# Patient Record
Sex: FEMALE | Race: OTHER | HISPANIC OR LATINO | Employment: FULL TIME | URBAN - NONMETROPOLITAN AREA
[De-identification: names, ages, dates, MRNs, and addresses within clinical notes are randomized per-mention and may not be internally consistent; named-entity substitution may affect disease eponyms.]

---

## 2022-09-23 ENCOUNTER — APPOINTMENT (OUTPATIENT)
Dept: LAB | Facility: CLINIC | Age: 42
End: 2022-09-23
Payer: COMMERCIAL

## 2022-09-23 DIAGNOSIS — Z13.220 SCREENING FOR LIPID DISORDERS: ICD-10-CM

## 2022-09-23 DIAGNOSIS — Z13.21 ENCOUNTER FOR VITAMIN DEFICIENCY SCREENING: ICD-10-CM

## 2022-09-23 DIAGNOSIS — Z11.59 NEED FOR HEPATITIS C SCREENING TEST: ICD-10-CM

## 2022-09-23 DIAGNOSIS — Z00.00 UNSPECIFIED GENERAL MEDICAL EXAMINATION: ICD-10-CM

## 2022-09-23 DIAGNOSIS — Z13.0 SCREENING FOR DEFICIENCY ANEMIA: ICD-10-CM

## 2022-09-23 DIAGNOSIS — Z13.29 SCREENING FOR THYROID DISORDER: ICD-10-CM

## 2022-09-23 LAB
BASOPHILS # BLD AUTO: 0.02 THOUSANDS/ΜL (ref 0–0.1)
BASOPHILS NFR BLD AUTO: 0 % (ref 0–1)
EOSINOPHIL # BLD AUTO: 0.02 THOUSAND/ΜL (ref 0–0.61)
EOSINOPHIL NFR BLD AUTO: 0 % (ref 0–6)
ERYTHROCYTE [DISTWIDTH] IN BLOOD BY AUTOMATED COUNT: 17.9 % (ref 11.6–15.1)
HCT VFR BLD AUTO: 35.7 % (ref 34.8–46.1)
HGB BLD-MCNC: 11.5 G/DL (ref 11.5–15.4)
IMM GRANULOCYTES # BLD AUTO: 0.01 THOUSAND/UL (ref 0–0.2)
IMM GRANULOCYTES NFR BLD AUTO: 0 % (ref 0–2)
LYMPHOCYTES # BLD AUTO: 1.83 THOUSANDS/ΜL (ref 0.6–4.47)
LYMPHOCYTES NFR BLD AUTO: 28 % (ref 14–44)
MCH RBC QN AUTO: 22.5 PG (ref 26.8–34.3)
MCHC RBC AUTO-ENTMCNC: 32.2 G/DL (ref 31.4–37.4)
MCV RBC AUTO: 70 FL (ref 82–98)
MONOCYTES # BLD AUTO: 0.59 THOUSAND/ΜL (ref 0.17–1.22)
MONOCYTES NFR BLD AUTO: 9 % (ref 4–12)
NEUTROPHILS # BLD AUTO: 4.09 THOUSANDS/ΜL (ref 1.85–7.62)
NEUTS SEG NFR BLD AUTO: 63 % (ref 43–75)
NRBC BLD AUTO-RTO: 0 /100 WBCS
PLATELET # BLD AUTO: 331 THOUSANDS/UL (ref 149–390)
PMV BLD AUTO: 10.8 FL (ref 8.9–12.7)
RBC # BLD AUTO: 5.1 MILLION/UL (ref 3.81–5.12)
WBC # BLD AUTO: 6.56 THOUSAND/UL (ref 4.31–10.16)

## 2022-09-23 PROCEDURE — 36415 COLL VENOUS BLD VENIPUNCTURE: CPT

## 2022-09-23 PROCEDURE — 86803 HEPATITIS C AB TEST: CPT

## 2022-09-23 PROCEDURE — 84443 ASSAY THYROID STIM HORMONE: CPT

## 2022-09-23 PROCEDURE — 85025 COMPLETE CBC W/AUTO DIFF WBC: CPT

## 2022-09-23 PROCEDURE — 80053 COMPREHEN METABOLIC PANEL: CPT

## 2022-09-23 PROCEDURE — 82306 VITAMIN D 25 HYDROXY: CPT

## 2022-09-23 PROCEDURE — 80061 LIPID PANEL: CPT

## 2022-09-24 LAB
25(OH)D3 SERPL-MCNC: 20.6 NG/ML (ref 30–100)
ALBUMIN SERPL BCP-MCNC: 3.7 G/DL (ref 3.5–5)
ALP SERPL-CCNC: 53 U/L (ref 46–116)
ALT SERPL W P-5'-P-CCNC: 24 U/L (ref 12–78)
ANION GAP SERPL CALCULATED.3IONS-SCNC: 5 MMOL/L (ref 4–13)
AST SERPL W P-5'-P-CCNC: 18 U/L (ref 5–45)
BILIRUB SERPL-MCNC: 0.81 MG/DL (ref 0.2–1)
BUN SERPL-MCNC: 10 MG/DL (ref 5–25)
CALCIUM SERPL-MCNC: 9.2 MG/DL (ref 8.3–10.1)
CHLORIDE SERPL-SCNC: 106 MMOL/L (ref 96–108)
CHOLEST SERPL-MCNC: 213 MG/DL
CO2 SERPL-SCNC: 25 MMOL/L (ref 21–32)
CREAT SERPL-MCNC: 0.76 MG/DL (ref 0.6–1.3)
GFR SERPL CREATININE-BSD FRML MDRD: 96 ML/MIN/1.73SQ M
GLUCOSE P FAST SERPL-MCNC: 71 MG/DL (ref 65–99)
HCV AB SER QL: NORMAL
HDLC SERPL-MCNC: 69 MG/DL
LDLC SERPL CALC-MCNC: 132 MG/DL (ref 0–100)
NONHDLC SERPL-MCNC: 144 MG/DL
POTASSIUM SERPL-SCNC: 4.2 MMOL/L (ref 3.5–5.3)
PROT SERPL-MCNC: 7.8 G/DL (ref 6.4–8.4)
SODIUM SERPL-SCNC: 136 MMOL/L (ref 135–147)
TRIGL SERPL-MCNC: 62 MG/DL
TSH SERPL DL<=0.05 MIU/L-ACNC: 1.16 UIU/ML (ref 0.45–4.5)

## 2022-11-18 ENCOUNTER — DOCTOR'S OFFICE (OUTPATIENT)
Dept: URBAN - NONMETROPOLITAN AREA CLINIC 1 | Facility: CLINIC | Age: 42
Setting detail: OPHTHALMOLOGY
End: 2022-11-18
Payer: COMMERCIAL

## 2022-11-18 DIAGNOSIS — H11.023: ICD-10-CM

## 2022-11-18 DIAGNOSIS — H43.813: ICD-10-CM

## 2022-11-18 PROBLEM — H52.03 HYPEROPIA; BOTH EYES: Status: ACTIVE | Noted: 2022-11-18

## 2022-11-18 PROCEDURE — 99204 OFFICE O/P NEW MOD 45 MIN: CPT | Performed by: OPHTHALMOLOGY

## 2022-11-18 ASSESSMENT — DRY EYES - PHYSICIAN NOTES
OS_GENERALCOMMENTS: TRACE PEE
OD_GENERALCOMMENTS: TRACE PEE

## 2022-11-18 ASSESSMENT — AXIALLENGTH_DERIVED
OS_AL: 23.7474
OD_AL: 23.3033

## 2022-11-18 ASSESSMENT — CORNEAL PTERYGIUM
OS_PTERYGIUM: NASAL 4MM
OD_PTERYGIUM: NASAL

## 2022-11-18 ASSESSMENT — SPHEQUIV_DERIVED
OD_SPHEQUIV: 0.75
OS_SPHEQUIV: -0.75

## 2022-11-18 ASSESSMENT — REFRACTION_AUTOREFRACTION
OD_AXIS: 075
OS_SPHERE: +1.75
OD_SPHERE: +1.00
OD_CYLINDER: -0.50
OS_CYLINDER: -5.00
OS_AXIS: 096

## 2022-11-18 ASSESSMENT — KERATOMETRY
OD_AXISANGLE_DEGREES: 089
OD_K1POWER_DIOPTERS: 42.75
OD_K2POWER_DIOPTERS: 44.25
OS_K1POWER_DIOPTERS: 43.25
OS_AXISANGLE_DEGREES: 015
OS_K2POWER_DIOPTERS: 44.50

## 2022-11-18 ASSESSMENT — REFRACTION_CURRENTRX
OD_OVR_VA: 20/
OS_OVR_VA: 20/

## 2022-11-18 ASSESSMENT — VISUAL ACUITY
OD_BCVA: 20/20
OS_BCVA: 20/20

## 2022-11-18 ASSESSMENT — CONFRONTATIONAL VISUAL FIELD TEST (CVF)
OD_FINDINGS: FULL
OS_FINDINGS: FULL

## 2023-01-10 ENCOUNTER — AMBUL SURGICAL CARE (OUTPATIENT)
Dept: URBAN - NONMETROPOLITAN AREA SURGERY 1 | Facility: SURGERY | Age: 43
Setting detail: OPHTHALMOLOGY
End: 2023-01-10
Payer: COMMERCIAL

## 2023-01-10 DIAGNOSIS — H11.022: ICD-10-CM

## 2023-01-10 PROCEDURE — G8918 PT W/O PREOP ORDER IV AB PRO: HCPCS | Performed by: OPHTHALMOLOGY

## 2023-01-10 PROCEDURE — G8907 PT DOC NO EVENTS ON DISCHARG: HCPCS | Performed by: OPHTHALMOLOGY

## 2023-01-10 PROCEDURE — 65780 OCULAR RECONST TRANSPLANT: CPT | Performed by: OPHTHALMOLOGY

## 2023-01-11 ENCOUNTER — DOCTOR'S OFFICE (OUTPATIENT)
Dept: URBAN - NONMETROPOLITAN AREA CLINIC 1 | Facility: CLINIC | Age: 43
Setting detail: OPHTHALMOLOGY
End: 2023-01-11
Payer: COMMERCIAL

## 2023-01-11 ENCOUNTER — RX ONLY (RX ONLY)
Age: 43
End: 2023-01-11

## 2023-01-11 DIAGNOSIS — H11.021: ICD-10-CM

## 2023-01-11 PROCEDURE — 99024 POSTOP FOLLOW-UP VISIT: CPT | Performed by: OPHTHALMOLOGY

## 2023-01-11 ASSESSMENT — DRY EYES - PHYSICIAN NOTES: OD_GENERALCOMMENTS: TRACE PEE

## 2023-01-11 ASSESSMENT — REFRACTION_AUTOREFRACTION
OS_CYLINDER: -5.00
OD_SPHERE: +1.00
OD_CYLINDER: -0.50
OS_AXIS: 096
OS_SPHERE: +1.75
OD_AXIS: 075

## 2023-01-11 ASSESSMENT — VISUAL ACUITY
OD_BCVA: 20/30-1
OS_BCVA: 20/20

## 2023-01-11 ASSESSMENT — KERATOMETRY
OD_K1POWER_DIOPTERS: 42.75
OS_K2POWER_DIOPTERS: 44.50
OS_K1POWER_DIOPTERS: 43.25
OD_K2POWER_DIOPTERS: 44.25
OS_AXISANGLE_DEGREES: 015
OD_AXISANGLE_DEGREES: 089

## 2023-01-11 ASSESSMENT — REFRACTION_CURRENTRX
OS_OVR_VA: 20/
OD_OVR_VA: 20/

## 2023-01-11 ASSESSMENT — CORNEAL PTERYGIUM: OD_PTERYGIUM: NASAL

## 2023-01-11 ASSESSMENT — SPHEQUIV_DERIVED
OD_SPHEQUIV: 0.75
OS_SPHEQUIV: -0.75

## 2023-01-11 ASSESSMENT — AXIALLENGTH_DERIVED
OD_AL: 23.3033
OS_AL: 23.7474

## 2023-01-20 ENCOUNTER — RX ONLY (RX ONLY)
Age: 43
End: 2023-01-20

## 2023-01-20 ENCOUNTER — DOCTOR'S OFFICE (OUTPATIENT)
Dept: URBAN - NONMETROPOLITAN AREA CLINIC 1 | Facility: CLINIC | Age: 43
Setting detail: OPHTHALMOLOGY
End: 2023-01-20
Payer: COMMERCIAL

## 2023-01-20 DIAGNOSIS — H11.021: ICD-10-CM

## 2023-01-20 PROCEDURE — 99024 POSTOP FOLLOW-UP VISIT: CPT | Performed by: OPHTHALMOLOGY

## 2023-01-20 ASSESSMENT — KERATOMETRY
OD_K2POWER_DIOPTERS: 43.75
OD_AXISANGLE_DEGREES: 110
OS_K1POWER_DIOPTERS: 43.75
OS_K2POWER_DIOPTERS: 48.25
OD_K1POWER_DIOPTERS: 42.50
OS_AXISANGLE_DEGREES: 006

## 2023-01-20 ASSESSMENT — REFRACTION_AUTOREFRACTION
OD_SPHERE: +1.00
OD_AXIS: 062
OD_CYLINDER: -0.50
OS_CYLINDER: -1.75
OS_AXIS: 178
OS_SPHERE: +2.25

## 2023-01-20 ASSESSMENT — REFRACTION_CURRENTRX
OS_OVR_VA: 20/
OD_OVR_VA: 20/

## 2023-01-20 ASSESSMENT — DRY EYES - PHYSICIAN NOTES: OD_GENERALCOMMENTS: TRACE PEE

## 2023-01-20 ASSESSMENT — SPHEQUIV_DERIVED
OD_SPHEQUIV: 0.75
OS_SPHEQUIV: 1.375

## 2023-01-20 ASSESSMENT — CONFRONTATIONAL VISUAL FIELD TEST (CVF)
OD_FINDINGS: FULL
OS_FINDINGS: FULL

## 2023-01-20 ASSESSMENT — VISUAL ACUITY
OD_BCVA: 20/25-1
OS_BCVA: 20/20-2

## 2023-01-20 ASSESSMENT — CORNEAL PTERYGIUM: OD_PTERYGIUM: NASAL

## 2023-01-20 ASSESSMENT — AXIALLENGTH_DERIVED
OD_AL: 23.4384
OS_AL: 22.2223

## 2023-02-15 ENCOUNTER — EVALUATION (OUTPATIENT)
Dept: PHYSICAL THERAPY | Facility: CLINIC | Age: 43
End: 2023-02-15

## 2023-02-15 DIAGNOSIS — M25.511 ACUTE PAIN OF RIGHT SHOULDER: Primary | ICD-10-CM

## 2023-02-15 NOTE — PROGRESS NOTES
PT Evaluation     Today's date: 2/15/2023  Patient name: Barbie Tinoco  : 1980  MRN: [id number]  Referring provider: Rita Brittle  Dx:   Encounter Diagnosis     ICD-10-CM    1  Acute pain of right shoulder  M25 511           Start Time: 1120  Stop Time: 1208  Total time in clinic (min): 48 minutes    Assessment  Assessment details:  Falguni Agustin  is a 38 y o female who presents to OP PT with signs and symptoms consistent with R shoulder/scapula pain  Upon examination, PT notes key impairments of decreased R shoulder/scapula mobility and strength  Due to this patient is limited with performing ADL/IADLs, reaching OH and behind her back, carrying heavy objects, lifting repetitively and sleeping  Additionally, patient is restricted in participating in social gatherings, recreational activity such as attending the gym and grocery shopping, and working full duty at [employer]  Patient will benefit from skilled PT to address above impairments with POC consisting of functional ROM, stretching, strengthening, balance, analgesic modalities and manual therapy in order to maximize functional independence  Thank you for your referral!     Impairments: abnormal or restricted ROM, impaired physical strength, lacks appropriate home exercise program, pain with function and poor posture     Symptom irritability: lowUnderstanding of Dx/Px/POC: good   Prognosis: good    Goals  STG(In 4 weeks)  Patient will initiate HEP  Patient will decrease R shoulder pain from 10/10 to 5/10  in order to improve QOL  LTG(In 8 weeks)  Patient will decrease pain from  to   in order to improve QOL  Patient will increase shoulder ROM to [facility] in order to be able to perform lifting/carry duties at work  Patient will increase shoulder/scapular strength to [facility] in order to be able to perform lifting/carrying duties at work  Patient will increase FOTO score from 59 to 68 in order to improve QOL  Patient will be independent with HEP at D/c  Plan  Patient would benefit from: skilled physical therapy and PT eval  Planned modality interventions: fluidotherapy, electrical stimulation/Russian stimulation, thermotherapy: hydrocollator packs and TENS  Planned therapy interventions: home exercise program, patient education, postural training, neuromuscular re-education, motor coordination training, flexibility, strengthening, stretching, therapeutic activities, therapeutic exercise, self care and activity modification  Frequency: 2x week  Duration in weeks: 8  Treatment plan discussed with: patient        Subjective Evaluation    History of Present Illness  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  Quality of life: good    Pain  Current pain ratin  At best pain ratin  At worst pain rating: 10  Location: L shoulder posterior  Relieving factors: medications, relaxation and rest  Aggravating factors: overhead activity and lifting  Progression: no change    Social Support    Working: 3 days a week carrying boxes    Hand dominance: right      Diagnostic Tests  No diagnostic tests performed  Treatments  Current treatment: physical therapy  Patient Goals  Patient goals for therapy: decreased pain, increased motion, increased strength, independence with ADLs/IADLs and return to work          Objective     Active Range of Motion   Left Shoulder   Normal active range of motion  Internal rotation BTB: L1     Right Shoulder   Normal active range of motion  Flexion: WFL and with pain  Abduction: WFL and with pain  Internal rotation BTB: L5     Scapular Mobility   Left Shoulder   Scapular mobility: fair    Right Shoulder   Scapular mobility: good    Strength/Myotome Testing     Left Shoulder     Planes of Motion   Flexion: 4   Abduction: 4   External rotation at 90°: 4     Right Shoulder   Normal muscle strength    Tests     Left Shoulder   Negative AC shear, active compression ([place]), belly press, drop arm, empty can, full can, Hawkin's and lift-off  Right Shoulder   Negative AC shear, active compression ([place]), belly press, drop arm, empty can, full can, Hawkin's and lift-off                Precautions: N/A       Manuals 2/15            UT/LS stretching              Scapular mobs              Cervical traction                          Neuro Re-Ed                                                                                                        Ther Ex             UBE             Shoulder shrugs              Shoulder retractions              MTP/LTP             Wall slides: YTI              Seated thoracic extension             Cat/Camel                                                                 Ther Activity                                       Gait Training                                       Modalities             P

## 2023-02-17 ENCOUNTER — OFFICE VISIT (OUTPATIENT)
Dept: PHYSICAL THERAPY | Facility: CLINIC | Age: 43
End: 2023-02-17

## 2023-02-17 DIAGNOSIS — M25.511 ACUTE PAIN OF RIGHT SHOULDER: Primary | ICD-10-CM

## 2023-02-17 NOTE — PROGRESS NOTES
Daily Note     Today's date: 2023  Patient name: Crow Grier  : 1980  MRN: [id number]  Referring provider: Dionicio Ortiz  Dx:   Encounter Diagnosis     ICD-10-CM    1  Acute pain of right shoulder  M25 511           Start Time: 0900  Stop Time: 0950  Total time in clinic (min): 50 minutes    Subjective:  Patient indicated that she is about the same since the initial evaluation yesterday  She has been performing her HEP with no problem  Objective: See treatment diary below      Assessment:  Patient began therapeutic exercise program during today's therapy session  Therapeutic exercise program was completed with good technique and no previous pain or symptoms  Continue to recommend PT in order achieve maximal functional independence  Plan: Continue per plan of care        Precautions: N/A       Manuals 2/15 2/17           UT/LS stretching   10'           Scapular mobs   NV           Cervical traction                          Neuro Re-Ed                                                                                                        Ther Ex             UBE  L1 10'           Shoulder shrugs   20x           Shoulder retractions   20x           MTP/LTP  NV           Wall slides: YTI   20x           Seated thoracic extension  20x           Cat/Camel  NV           Thread the needle  NV                                                  Ther Activity                                       Gait Training                                       Modalities             P

## 2023-02-20 ENCOUNTER — APPOINTMENT (OUTPATIENT)
Dept: PHYSICAL THERAPY | Facility: CLINIC | Age: 43
End: 2023-02-20

## 2023-02-20 NOTE — PROGRESS NOTES
Daily Note     Today's date: 2023  Patient name: Za Dean  : 1980  MRN: [id number]  Referring provider: Eric Castellano  Dx: No diagnosis found  Subjective:       Objective: See treatment diary below      Assessment:       Plan: Continue per plan of care        Precautions: N/A       Manuals 2/15 2/17 2/20          UT/LS stretching   10'           Scapular mobs   NV           Cervical traction                          Neuro Re-Ed                                                                                                        Ther Ex             UBE  L1 10'           Shoulder shrugs   20x           Shoulder retractions   20x           MTP/LTP  NV           Wall slides: YTI   20x           Seated thoracic extension  20x           Cat/Camel  NV           Thread the needle  NV                                                  Ther Activity                                       Gait Training                                       Modalities             P

## 2023-02-21 ENCOUNTER — OFFICE VISIT (OUTPATIENT)
Dept: PHYSICAL THERAPY | Facility: CLINIC | Age: 43
End: 2023-02-21

## 2023-02-21 DIAGNOSIS — M25.511 ACUTE PAIN OF RIGHT SHOULDER: Primary | ICD-10-CM

## 2023-02-21 NOTE — PROGRESS NOTES
Daily Note     Today's date: 2023  Patient name: Ashlyn Melchor  : 1980  MRN: [id number]  Referring provider: Andrés Abdul  Dx:   Encounter Diagnosis     ICD-10-CM    1  Acute pain of right shoulder  M25 511                      Subjective:   "Im ok"     Objective: See treatment diary below      Assessment: Tolerated treatment fair  Patient exhibited good technique with therapeutic exercises      Pt did c/o increased pain post session  Plan: Continue per plan of care        Precautions: N/A       Manuals 2/15 2/17 2/21/23          UT/LS stretching   10' 5          Scapular mobs   NV 5          Cervical traction                          Neuro Re-Ed                                                                                                        Ther Ex             UBE  L1 10' L1  10           Shoulder shrugs   20x 20          Shoulder retractions   20x 20          MTP/LTP  NV 2x10  rtb          Wall slides: YTI   20x 20          Seated thoracic extension  20x 20x          Cat/Camel  NV attempted          Thread the needle  NV                                                  Ther Activity                                       Gait Training                                       Modalities             MHP   10 CP sitting

## 2023-02-22 ENCOUNTER — OFFICE VISIT (OUTPATIENT)
Dept: PHYSICAL THERAPY | Facility: CLINIC | Age: 43
End: 2023-02-22

## 2023-02-22 ENCOUNTER — DOCTOR'S OFFICE (OUTPATIENT)
Dept: URBAN - NONMETROPOLITAN AREA CLINIC 1 | Facility: CLINIC | Age: 43
Setting detail: OPHTHALMOLOGY
End: 2023-02-22
Payer: COMMERCIAL

## 2023-02-22 DIAGNOSIS — M25.511 ACUTE PAIN OF RIGHT SHOULDER: Primary | ICD-10-CM

## 2023-02-22 DIAGNOSIS — H11.021: ICD-10-CM

## 2023-02-22 PROCEDURE — 99024 POSTOP FOLLOW-UP VISIT: CPT | Performed by: OPHTHALMOLOGY

## 2023-02-22 ASSESSMENT — CONFRONTATIONAL VISUAL FIELD TEST (CVF)
OD_FINDINGS: FULL
OS_FINDINGS: FULL

## 2023-02-22 ASSESSMENT — REFRACTION_AUTOREFRACTION
OD_CYLINDER: -0.50
OD_AXIS: 062
OS_CYLINDER: -1.75
OS_AXIS: 178
OD_SPHERE: +1.00
OS_SPHERE: +2.25

## 2023-02-22 ASSESSMENT — REFRACTION_CURRENTRX
OD_OVR_VA: 20/
OS_OVR_VA: 20/

## 2023-02-22 ASSESSMENT — KERATOMETRY
OS_K2POWER_DIOPTERS: 48.25
OD_AXISANGLE_DEGREES: 110
OD_K2POWER_DIOPTERS: 43.75
OD_K1POWER_DIOPTERS: 42.50
OS_AXISANGLE_DEGREES: 006
OS_K1POWER_DIOPTERS: 43.75

## 2023-02-22 ASSESSMENT — AXIALLENGTH_DERIVED
OS_AL: 22.2223
OD_AL: 23.4384

## 2023-02-22 ASSESSMENT — DRY EYES - PHYSICIAN NOTES: OD_GENERALCOMMENTS: TRACE PEE

## 2023-02-22 ASSESSMENT — CORNEAL PTERYGIUM: OD_PTERYGIUM: NASAL

## 2023-02-22 ASSESSMENT — SPHEQUIV_DERIVED
OS_SPHEQUIV: 1.375
OD_SPHEQUIV: 0.75

## 2023-02-22 ASSESSMENT — VISUAL ACUITY
OD_BCVA: 20/20-2
OS_BCVA: 20/20-2

## 2023-02-22 NOTE — PROGRESS NOTES
Daily Note     Today's date: 2023  Patient name: Hien Layne  : 1980  MRN: [id number]  Referring provider: Porter Meza  Dx:   Encounter Diagnosis     ICD-10-CM    1  Acute pain of right shoulder  M25 511           Start Time: 1528  Stop Time: 1609  Total time in clinic (min): 41 minutes    Subjective: Patient indicated that her back is feeling better but she is feeling more symptoms in left shoulder and arm  Objective: See treatment diary below    Assessment:  Therapeutic exercise program was completed with good technique and no previous pain or symptoms  Continue to recommend PT in order achieve maximal functional independence  Plan: Continue per plan of care        Precautions: N/A       Manuals 2/15 2/17 2/21/23 2/22/23         UT/LS stretching   10' 5 5'         Scapular mobs   NV 5 5'         Cervical traction                          Neuro Re-Ed                                                                                                        Ther Ex             UBE  L1 10' L1  10  L1 10'         Shoulder shrugs   20x 20 20x         Shoulder retractions   20x 20 20x         MTP/LTP  NV 2x10  rtb 2x10 RTB         Wall slides: YTI   20x 20 prone:20x2#         Seated thoracic extension  20x 20x 20x         Cat/Camel  NV attempted          Thread the needle  NV                                                  Ther Activity                                       Gait Training                                       Modalities             MHP   10 CP sitting 10'      sitting

## 2023-02-27 ENCOUNTER — APPOINTMENT (OUTPATIENT)
Dept: PHYSICAL THERAPY | Facility: CLINIC | Age: 43
End: 2023-02-27

## 2023-03-01 ENCOUNTER — OFFICE VISIT (OUTPATIENT)
Dept: PHYSICAL THERAPY | Facility: CLINIC | Age: 43
End: 2023-03-01

## 2023-03-01 ENCOUNTER — APPOINTMENT (OUTPATIENT)
Dept: PHYSICAL THERAPY | Facility: CLINIC | Age: 43
End: 2023-03-01

## 2023-03-01 DIAGNOSIS — M25.511 ACUTE PAIN OF RIGHT SHOULDER: Primary | ICD-10-CM

## 2023-03-01 NOTE — PROGRESS NOTES
Daily Note     Today's date: 3/1/2023  Patient name: Marj Chaves  : 1980  MRN: [id number]  Referring provider: Juan Carlos Palafox  Dx:   Encounter Diagnosis     ICD-10-CM    1  Acute pain of right shoulder  M25 511                      Subjective: Patient states she's doing good  She gets some pain with work  Objective: See treatment diary below      Assessment: Tolerated treatment well  Patient exhibited good technique with therapeutic exercises  Improving flexibility noted t/o c/s  Plan: Continue per plan of care        Precautions: N/A       Manuals 2/15 2/17 2/21/23 2/22/23 3/1        UT/LS stretching   10' 5 5' 5 min        Scapular mobs   NV 5 5' 5 min        Cervical traction                          Neuro Re-Ed                                                                                                        Ther Ex             UBE  L1 10' L1  10  L1 10' L1 10 min        Shoulder shrugs   20x 20 20x 1# 2x10        Shoulder retractions   20x 20 20x 1# 2x10        MTP/LTP  NV 2x10  rtb 2x10 RTB RTB 2x10        Wall slides: YTI   20x 20 prone:20x2# Prone 20x 2#        Seated thoracic extension  20x 20x 20x 20x        Cat/Camel  NV attempted          Thread the needle  NV                                                  Ther Activity                                       Gait Training                                       Modalities             MHP   10 CP sitting 10'      sitting 10 min sitting right shoulder MHP

## 2023-03-06 ENCOUNTER — APPOINTMENT (OUTPATIENT)
Dept: PHYSICAL THERAPY | Facility: CLINIC | Age: 43
End: 2023-03-06

## 2023-03-08 ENCOUNTER — APPOINTMENT (OUTPATIENT)
Dept: PHYSICAL THERAPY | Facility: CLINIC | Age: 43
End: 2023-03-08

## 2023-03-13 ENCOUNTER — APPOINTMENT (OUTPATIENT)
Dept: PHYSICAL THERAPY | Facility: CLINIC | Age: 43
End: 2023-03-13

## 2023-03-21 ENCOUNTER — APPOINTMENT (OUTPATIENT)
Dept: PHYSICAL THERAPY | Facility: CLINIC | Age: 43
End: 2023-03-21

## 2023-03-21 ENCOUNTER — OFFICE VISIT (OUTPATIENT)
Dept: PHYSICAL THERAPY | Facility: CLINIC | Age: 43
End: 2023-03-21

## 2023-03-21 DIAGNOSIS — M25.511 ACUTE PAIN OF RIGHT SHOULDER: Primary | ICD-10-CM

## 2023-03-21 NOTE — PROGRESS NOTES
Daily Note     Today's date: 3/21/2023  Patient name: River Curran  : 1980  MRN: [id number]  Referring provider: Isela Councilman  Dx:   Encounter Diagnosis     ICD-10-CM    1  Acute pain of right shoulder  M25 511                      Subjective: Patient without new c/o today  Objective: See treatment diary below      Assessment: Tolerated treatment well  Patient exhibited good technique with therapeutic exercises      Plan: Continue per plan of care        Precautions: N/A       Manuals 2/15 2/17 2/21/23 2/22/23 3/1 3/21       UT/LS stretching   10' 5 5' 5 min 5 min       Scapular mobs   NV 5 5' 5 min 5 min       Cervical traction                          Neuro Re-Ed                                                                                                        Ther Ex             UBE  L1 10' L1  10  L1 10' L1 10 min L1 10 min       Shoulder shrugs   20x 20 20x 1# 2x10 1# 2x10       Shoulder retractions   20x 20 20x 1# 2x10 1# 2x10       MTP/LTP  NV 2x10  rtb 2x10 RTB RTB 2x10 RTB 2x10       Wall slides: YTI   20x 20 prone:20x2# Prone 20x 2#        Seated thoracic extension  20x 20x 20x 20x 20x       Cat/Camel  NV attempted          Thread the needle  NV                                                  Ther Activity                                       Gait Training                                       Modalities             MHP   10 CP sitting 10'      sitting 10 min sitting right shoulder MHP

## 2023-03-22 ENCOUNTER — OFFICE VISIT (OUTPATIENT)
Dept: PHYSICAL THERAPY | Facility: CLINIC | Age: 43
End: 2023-03-22

## 2023-03-22 ENCOUNTER — APPOINTMENT (OUTPATIENT)
Dept: PHYSICAL THERAPY | Facility: CLINIC | Age: 43
End: 2023-03-22

## 2023-03-22 DIAGNOSIS — M25.511 ACUTE PAIN OF RIGHT SHOULDER: Primary | ICD-10-CM

## 2023-03-22 NOTE — PROGRESS NOTES
Daily Note     Today's date: 3/22/2023  Patient name: Marzena Dent  : 1980  MRN: [id number]  Referring provider: Anel Lindsey  Dx:   Encounter Diagnosis     ICD-10-CM    1  Acute pain of right shoulder  M25 511           Start Time: 1524  Stop Time: 1608  Total time in clinic (min): 44 minutes    Subjective: Patient indicated that she continues to have minor discomfort when she is lifting at work but besides that she feels fine  She would like to continue with therapy due to still experiencing discomfort at work  Objective: See treatment diary below      Assessment:  Therapeutic exercise program was completed with good technique and no previous pain or symptoms  Continue to recommend PT in order achieve maximal functional independence  Plan: Continue per plan of care        Precautions: N/A       Manuals 2/15 2/17 2/21/23 2/22/23 3/1 3/21 3/22      UT/LS stretching   10' 5 5' 5 min 5 min 5'      Scapular mobs   NV 5 5' 5 min 5 min 5'      Cervical traction                          Neuro Re-Ed                                                                                                        Ther Ex             UBE  L1 10' L1  10  L1 10' L1 10 min L1 10 min L1 10'      Shoulder shrugs   20x 20 20x 1# 2x10 1# 2x10 2x10 2#      Shoulder retractions   20x 20 20x 1# 2x10 1# 2x10 2x10 2#      MTP/LTP  NV 2x10  rtb 2x10 RTB RTB 2x10 RTB 2x10 BTB 2x10       Wall slides: YTI   20x 20 prone:20x2# Prone 20x 2# Prone 20x2# Prone 20x2#      Seated thoracic extension  20x 20x 20x 20x 20x with ball  20x with ball       Thread the needle  NV                                                  Ther Activity                                       Gait Training                                       Modalities             MHP   10 CP sitting 10'      sitting 10 min sitting right shoulder MHP  10'

## 2023-03-28 ENCOUNTER — APPOINTMENT (OUTPATIENT)
Dept: PHYSICAL THERAPY | Facility: CLINIC | Age: 43
End: 2023-03-28

## 2023-03-29 ENCOUNTER — APPOINTMENT (OUTPATIENT)
Dept: PHYSICAL THERAPY | Facility: CLINIC | Age: 43
End: 2023-03-29

## 2023-03-29 ENCOUNTER — DOCTOR'S OFFICE (OUTPATIENT)
Dept: URBAN - NONMETROPOLITAN AREA CLINIC 1 | Facility: CLINIC | Age: 43
Setting detail: OPHTHALMOLOGY
End: 2023-03-29
Payer: COMMERCIAL

## 2023-03-29 ENCOUNTER — OFFICE VISIT (OUTPATIENT)
Dept: PHYSICAL THERAPY | Facility: CLINIC | Age: 43
End: 2023-03-29

## 2023-03-29 DIAGNOSIS — H04.123: ICD-10-CM

## 2023-03-29 DIAGNOSIS — H11.023: ICD-10-CM

## 2023-03-29 DIAGNOSIS — M25.511 ACUTE PAIN OF RIGHT SHOULDER: Primary | ICD-10-CM

## 2023-03-29 PROCEDURE — 99024 POSTOP FOLLOW-UP VISIT: CPT | Performed by: OPHTHALMOLOGY

## 2023-03-29 ASSESSMENT — REFRACTION_AUTOREFRACTION
OS_CYLINDER: -1.75
OS_SPHERE: +2.25
OS_AXIS: 178
OD_SPHERE: +1.00
OD_AXIS: 062
OD_CYLINDER: -0.50

## 2023-03-29 ASSESSMENT — VISUAL ACUITY
OS_BCVA: 20/25
OD_BCVA: 20/20

## 2023-03-29 ASSESSMENT — SPHEQUIV_DERIVED
OD_SPHEQUIV: 0.75
OS_SPHEQUIV: 1.375

## 2023-03-29 ASSESSMENT — DRY EYES - PHYSICIAN NOTES
OD_GENERALCOMMENTS: TRACE PEE
OS_GENERALCOMMENTS: TRACE PEE

## 2023-03-29 ASSESSMENT — REFRACTION_CURRENTRX
OD_OVR_VA: 20/
OS_OVR_VA: 20/

## 2023-03-29 ASSESSMENT — CORNEAL PTERYGIUM
OS_PTERYGIUM: NASAL
OD_PTERYGIUM: NASAL

## 2023-03-29 ASSESSMENT — KERATOMETRY
OS_AXISANGLE_DEGREES: 006
OS_K1POWER_DIOPTERS: 43.75
OS_K2POWER_DIOPTERS: 48.25
OD_AXISANGLE_DEGREES: 110
OD_K1POWER_DIOPTERS: 42.50
OD_K2POWER_DIOPTERS: 43.75

## 2023-03-29 ASSESSMENT — AXIALLENGTH_DERIVED
OD_AL: 23.4384
OS_AL: 22.2223

## 2023-03-29 ASSESSMENT — CONFRONTATIONAL VISUAL FIELD TEST (CVF)
OD_FINDINGS: FULL
OS_FINDINGS: FULL

## 2023-03-29 NOTE — PROGRESS NOTES
Daily Note     Today's date: 3/29/2023  Patient name: Shahida Friend  : 1980  MRN: [id number]  Referring provider: Jade Cochran  Dx:   Encounter Diagnosis     ICD-10-CM    1  Acute pain of right shoulder  M25 511           Start Time:   Stop Time: 161  Total time in clinic (min): 51 minutes    Subjective: Patient indicated that today her back/shoulder are feeling good but she was having pain yesterday lifting at work  Objective: See treatment diary below      Assessment: Focused on improving scapular stabilizer strength and functional strength with box lift in order to prepare to return to work full duty  Therapeutic exercise program was completed with good technique and no previous pain or symptoms  Continue to recommend PT in order achieve maximal functional independence  Plan: Continue per plan of care        Precautions: N/A       Manuals 2/15 2/17 2/21/23 2/22/23 3/1 3/21 3/22 3/29     UT/LS stretching   10' 5 5' 5 min 5 min 5' 5'     Scapular mobs   NV 5 5' 5 min 5 min 5' 5'     Cervical traction                          Neuro Re-Ed                                                                                                        Ther Ex             UBE  L1 10' L1  10  L1 10' L1 10 min L1 10 min L1 10' L1 10'      Shoulder shrugs   20x 20 20x 1# 2x10 1# 2x10 2x10 2# 2x10 5#     Shoulder retractions   20x 20 20x 1# 2x10 1# 2x10 2x10 2# 2x10 5#     Standing shoulder ER/IR TB        NV NV     MTP/LTP  NV 2x10  rtb 2x10 RTB RTB 2x10 RTB 2x10 BTB 2x10  [place] 10# 20x ea     Seated thoracic extension  20x 20x 20x 20x 20x with ball  20x with ball  20x with ball      Open book       NV 10x ea      Prone row       NV 3# 20x     Prone YTI   2# 20x  2# 20x 2# 20x 2# 20x 2# 20x 3# 20x YTI      Box lift on high-low table 24 inch height        2x10 20#     Ther Activity                                       Gait Training                                       Modalities             P 10 CP sitting 10'      sitting 10 min sitting right shoulder MHP  10'

## 2023-04-04 ENCOUNTER — OFFICE VISIT (OUTPATIENT)
Dept: PHYSICAL THERAPY | Facility: CLINIC | Age: 43
End: 2023-04-04

## 2023-04-04 DIAGNOSIS — M25.511 ACUTE PAIN OF RIGHT SHOULDER: Primary | ICD-10-CM

## 2023-04-04 NOTE — PROGRESS NOTES
Daily Note     Today's date: 2023  Patient name: Rock Tom  : 1980  MRN: [id number]  Referring provider: Gama Rudd  Dx:   Encounter Diagnosis     ICD-10-CM    1  Acute pain of right shoulder  M25 511           Start Time: 1230  Stop Time: 1302  Total time in clinic (min): 32 minutes    Subjective: Patient arrived late to session today  No complaints offered at this time  Objective: See treatment diary below      Assessment: Patient completed modified program today with good tolerance, with warm up on UBE  Some correction with technique with aime exercises  Responded well to UT/LS stretching with STM  Heat applied to R shoulder/UT region post treat with good skin integrity  Plan: Continue per plan of care        Precautions: N/A       Manuals 2/15 2/17 2/21/23 2/22/23 3/1 3/21 3/22 3/29 4/4    UT/LS stretching   10' 5 5' 5 min 5 min 5' 5' 8' with STM    Scapular mobs   NV 5 5' 5 min 5 min 5' 5' NP    Cervical traction                          Neuro Re-Ed                                                                                                        Ther Ex             UBE  L1 10' L1  10  L1 10' L1 10 min L1 10 min L1 10' L1 10'  L1 5'     Shoulder shrugs   20x 20 20x 1# 2x10 1# 2x10 2x10 2# 2x10 5# 5# 2x10     Shoulder retractions   20x 20 20x 1# 2x10 1# 2x10 2x10 2# 2x10 5# 5# 2x10    Standing shoulder ER/IR TB        NV NV     MTP/LTP  NV 2x10  rtb 2x10 RTB RTB 2x10 RTB 2x10 BTB 2x10  Aime 10# 20x ea aime 10# 20x ea    Seated thoracic extension  20x 20x 20x 20x 20x with ball  20x with ball  20x with ball  20x w/ ball     Open book       NV 10x ea  NP    Prone row       NV 3# 20x  NP    Prone YTI   2# 20x  2# 20x 2# 20x 2# 20x 2# 20x 3# 20x YTI  NP    Box lift on high-low table 24 inch height        2x10 20# NP    Ther Activity                                       Gait Training                                       Modalities             MHP   10 CP sitting 10' sitting 10 min sitting right shoulder MHP  10'   8 min seated post treat

## 2023-04-18 ENCOUNTER — APPOINTMENT (OUTPATIENT)
Dept: PHYSICAL THERAPY | Facility: CLINIC | Age: 43
End: 2023-04-18

## 2023-04-19 ENCOUNTER — APPOINTMENT (OUTPATIENT)
Dept: PHYSICAL THERAPY | Facility: CLINIC | Age: 43
End: 2023-04-19

## 2023-04-25 ENCOUNTER — APPOINTMENT (OUTPATIENT)
Dept: PHYSICAL THERAPY | Facility: CLINIC | Age: 43
End: 2023-04-25

## 2023-04-26 ENCOUNTER — APPOINTMENT (OUTPATIENT)
Dept: PHYSICAL THERAPY | Facility: CLINIC | Age: 43
End: 2023-04-26

## 2023-05-24 ENCOUNTER — RX ONLY (RX ONLY)
Age: 43
End: 2023-05-24

## 2023-05-24 ENCOUNTER — DOCTOR'S OFFICE (OUTPATIENT)
Dept: URBAN - NONMETROPOLITAN AREA CLINIC 1 | Facility: CLINIC | Age: 43
Setting detail: OPHTHALMOLOGY
End: 2023-05-24
Payer: COMMERCIAL

## 2023-05-24 DIAGNOSIS — H10.13: ICD-10-CM

## 2023-05-24 DIAGNOSIS — H25.13: ICD-10-CM

## 2023-05-24 DIAGNOSIS — H11.023: ICD-10-CM

## 2023-05-24 DIAGNOSIS — H04.123: ICD-10-CM

## 2023-05-24 DIAGNOSIS — H35.373: ICD-10-CM

## 2023-05-24 PROCEDURE — 92014 COMPRE OPH EXAM EST PT 1/>: CPT | Performed by: OPHTHALMOLOGY

## 2023-05-24 ASSESSMENT — REFRACTION_AUTOREFRACTION
OD_AXIS: 070
OS_AXIS: 116
OS_SPHERE: +1.00
OD_CYLINDER: -0.50
OS_CYLINDER: -2.25
OD_SPHERE: +0.75

## 2023-05-24 ASSESSMENT — SPHEQUIV_DERIVED
OS_SPHEQUIV: -0.125
OD_SPHEQUIV: 0.5

## 2023-05-24 ASSESSMENT — KERATOMETRY
OD_K2POWER_DIOPTERS: 43.75
OS_K2POWER_DIOPTERS: 45.00
OS_AXISANGLE_DEGREES: 048
OS_K1POWER_DIOPTERS: 43.00
OD_K1POWER_DIOPTERS: 42.75
OD_AXISANGLE_DEGREES: 095

## 2023-05-24 ASSESSMENT — AXIALLENGTH_DERIVED
OS_AL: 23.4577
OD_AL: 23.4894

## 2023-05-24 ASSESSMENT — VISUAL ACUITY
OD_BCVA: 20/20
OS_BCVA: 20/25+2

## 2023-05-24 ASSESSMENT — REFRACTION_CURRENTRX
OS_OVR_VA: 20/
OD_OVR_VA: 20/

## 2023-05-24 ASSESSMENT — CONFRONTATIONAL VISUAL FIELD TEST (CVF)
OS_FINDINGS: FULL
OD_FINDINGS: FULL

## 2023-05-24 ASSESSMENT — CORNEAL PTERYGIUM
OD_PTERYGIUM: NASAL
OS_PTERYGIUM: NASAL

## 2023-06-22 ENCOUNTER — APPOINTMENT (OUTPATIENT)
Dept: RADIOLOGY | Facility: CLINIC | Age: 43
End: 2023-06-22
Payer: COMMERCIAL

## 2023-06-22 DIAGNOSIS — M54.59 OTHER LOW BACK PAIN: ICD-10-CM

## 2023-06-22 DIAGNOSIS — M79.18 BUTTOCK PAIN: ICD-10-CM

## 2023-06-22 PROCEDURE — 73522 X-RAY EXAM HIPS BI 3-4 VIEWS: CPT

## 2023-06-22 PROCEDURE — 72220 X-RAY EXAM SACRUM TAILBONE: CPT

## 2023-06-22 PROCEDURE — 72100 X-RAY EXAM L-S SPINE 2/3 VWS: CPT

## 2024-04-19 ENCOUNTER — OFFICE VISIT (OUTPATIENT)
Dept: URGENT CARE | Facility: CLINIC | Age: 44
End: 2024-04-19
Payer: COMMERCIAL

## 2024-04-19 VITALS
TEMPERATURE: 102 F | DIASTOLIC BLOOD PRESSURE: 52 MMHG | WEIGHT: 140.8 LBS | RESPIRATION RATE: 20 BRPM | OXYGEN SATURATION: 99 % | HEART RATE: 64 BPM | SYSTOLIC BLOOD PRESSURE: 83 MMHG

## 2024-04-19 DIAGNOSIS — J03.90 ACUTE TONSILLITIS, UNSPECIFIED ETIOLOGY: ICD-10-CM

## 2024-04-19 DIAGNOSIS — J02.0 STREP PHARYNGITIS: Primary | ICD-10-CM

## 2024-04-19 DIAGNOSIS — R50.9 FEVER, UNSPECIFIED FEVER CAUSE: ICD-10-CM

## 2024-04-19 LAB — S PYO DNA THROAT QL NAA+PROBE: DETECTED

## 2024-04-19 PROCEDURE — 99203 OFFICE O/P NEW LOW 30 MIN: CPT | Performed by: PHYSICIAN ASSISTANT

## 2024-04-19 RX ORDER — AMOXICILLIN 500 MG/1
500 CAPSULE ORAL EVERY 12 HOURS SCHEDULED
Qty: 20 CAPSULE | Refills: 0 | Status: SHIPPED | OUTPATIENT
Start: 2024-04-19 | End: 2024-04-29

## 2024-04-19 RX ORDER — ACETAMINOPHEN 325 MG/1
975 TABLET ORAL ONCE
Status: COMPLETED | OUTPATIENT
Start: 2024-04-19 | End: 2024-04-19

## 2024-04-19 RX ADMIN — ACETAMINOPHEN 975 MG: 325 TABLET ORAL at 17:03

## 2024-04-19 NOTE — PATIENT INSTRUCTIONS
Faringitis estreptocócica   LO QUE NECESITA SABER:   La faringitis estreptocócica es rl infección en la garganta causada por rl bacteria. Se contagia fácilmente de persona a persona.  INSTRUCCIONES SOBRE EL KEN HOSPITALARIA:   Llame al 911 en justin de presentar lo siguiente:  Usted tiene dificultad para respirar.      Regrese a la hang de emergencias si:  Usted presenta nuevos síntomas chintan un dolor de venkata severo, rigidez en el shane, dolor de pecho o vómito.    Usted está babeando a consecuencia de no poder tragarse meraz saliva.    Comuníquese con meraz médico si:  Tiene fiebre.    Usted tiene salpullido o dolor de oído.    Usted al toser o sonarse la nariz presenta rl mucosidad o flema armando, amarilla-café o sanguinolenta.    Usted es incapaz de ke líquidos.    Usted tiene preguntas o inquietudes acerca de meraz condición o cuidado.    Medicamentos:  Los antibióticos ayudarán a tratar meraz faringitis por estreptococo. Usted se sentirá mejor entre 2 y 3 días después de empezar a ke los antibióticos.    [place] baldemar medicamentos chintan se le haya indicado. Consulte con meraz médico si usted daniel que meraz medicamento no le está ayudando o si presenta efectos secundarios. Infórmele al médico si usted es alérgico a algún medicamento. Mantenga rl lista actualizada de los medicamentos, las vitaminas y los productos herbales que keyla. Incluya los siguientes datos de los medicamentos: cantidad, frecuencia y motivo de administración. Traiga con usted la lista o los envases de las píldoras a baldemar citas de seguimiento. Lleve la lista de los medicamentos con usted en justin de rl emergencia.    El manejo de baldemar síntomas:  Use pastillas para la garganta, hielo, alimentos suaves o helados de agua para aliviar meraz garganta.    [place] jugo, batidos con leche o sopa si meraz garganta está demasiado irritada y el dolor no le permite comer alimentos sólidos. Ke líquidos también puede ayudar a prevenir la deshidratación.    Simi gárgaras de agua  con sal. Mezcle ¼ de cucharadita de sal en un vaso de agua tibia y christy gárgaras. [place] podría ayudarle a reducir la inflamación en la garganta.    No fume. La nicotina y otros químicos contenidos en los cigarrillos y puros pueden causar daño pulmonar y empeorar baldemar síntomas. Pida información a meraz médico si usted actualmente fuma y necesita ayuda para dejar de fumar. Los cigarrillos electrónicos o el tabaco sin humo igualmente contienen nicotina. Consulte con meraz médico antes de utilizar estos productos.    Regrese al trabajo o a la escuela después de 24 horas de giovanni empezado los medicamentos de antibióticos.  Prevención de la propagación de la faringitis por estreptococo:  Lávese las davide frecuentemente. Utilice agua y jabón. Lávese las davide después de usar el baño, cambiarle el pañal a un cyndee o estornudar. Lávese las davide antes de comer o preparar alimentos.    No comparta alimentos o bebidas. Reemplace meraz cepillo de dientes 24 horas después de giovanni tomado antibióticos.    Acuda a la consulta de control con meraz médico según las indicaciones: Anote baldemar preguntas para que se acuerde de hacerlas camilla baldemar visitas.  © Copyright [employer] 2023 Information is for End User's use only and may not be sold, redistributed or otherwise used for commercial purposes.  Esta información es sólo para uso en educación. Meraz intención no es darle un consejo médico sobre enfermedades o tratamientos. Colsulte con meraz médico, enfermera o farmacéutico antes de seguir cualquier régimen médico para saber si es seguro y efectivo para usted.

## 2024-04-19 NOTE — PROGRESS NOTES
[facility]        NAME: Yareli Yan is a 43 y.o. female  : 1980    MRN: [id number]  DATE: 2024  TIME: 5:16 PM    Assessment and Plan   Strep pharyngitis [J02.0]  1. Strep pharyngitis  amoxicillin (AMOXIL) 500 mg capsule      2. Fever, unspecified fever cause  acetaminophen (TYLENOL) tablet 975 mg      3. Acute tonsillitis, unspecified etiology  POCT rapid PCR strepA            Patient Instructions     Patient Instructions   Faringitis estreptocócica   LO QUE NECESITA SABER:   La faringitis estreptocócica es rl infección en la garganta causada por rl bacteria. Se contagia fácilmente de persona a persona.  INSTRUCCIONES SOBRE EL KEN HOSPITALARIA:   Llame al 911 en justin de presentar lo siguiente:  Usted tiene dificultad para respirar.      Regrese a la hang de emergencias si:  Usted presenta nuevos síntomas chintan un dolor de venkata severo, rigidez en el shane, dolor de pecho o vómito.    Usted está babeando a consecuencia de no poder tragarse meraz saliva.    Comuníquese con meraz médico si:  Tiene fiebre.    Usted tiene salpullido o dolor de oído.    Usted al toser o sonarse la nariz presenta rl mucosidad o flema armando, amarilla-café o sanguinolenta.    Usted es incapaz de berenice líquidos.    Usted tiene preguntas o inquietudes acerca de meraz condición o cuidado.    Medicamentos:  Los antibióticos ayudarán a tratar meraz faringitis por estreptococo. Usted se sentirá mejor entre 2 y 3 días después de empezar a berenice los antibióticos.    [place] baldemar medicamentos chintan se le haya indicado. Consulte con meraz médico si usted daniel que meraz medicamento no le está ayudando o si presenta efectos secundarios. Infórmele al médico si usted es alérgico a algún medicamento. Mantenga rl lista actualizada de los medicamentos, las vitaminas y los productos herbales que keyla. Incluya los siguientes datos de los medicamentos: cantidad, frecuencia y motivo de administración. Traiga con usted la lista o los envases de  las píldoras a baldemar citas de seguimiento. Lleve la lista de los medicamentos con usted en justin de rl emergencia.    El manejo de baldemar síntomas:  Use pastillas para la garganta, hielo, alimentos suaves o helados de agua para aliviar meraz garganta.    [place] jugo, batidos con leche o sopa si meraz garganta está demasiado irritada y el dolor no le permite comer alimentos sólidos. Ke líquidos también puede ayudar a prevenir la deshidratación.    Christy gárgaras de agua con sal. Mezcle ¼ de cucharadita de sal en un vaso de agua tibia y christy gárgaras. [place] podría ayudarle a reducir la inflamación en la garganta.    No fume. La nicotina y otros químicos contenidos en los cigarrillos y puros pueden causar daño pulmonar y empeorar baldemar síntomas. Pida información a meraz médico si usted actualmente fuma y necesita ayuda para dejar de fumar. Los cigarrillos electrónicos o el tabaco sin humo igualmente contienen nicotina. Consulte con meraz médico antes de utilizar estos productos.    Regrese al trabajo o a la escuela después de 24 horas de giovanni empezado los medicamentos de antibióticos.  Prevención de la propagación de la faringitis por estreptococo:  Lávese las davide frecuentemente. Utilice agua y jabón. Lávese las davide después de usar el baño, cambiarle el pañal a un cyndee o estornudar. Lávese las davide antes de comer o preparar alimentos.    No comparta alimentos o bebidas. Reemplace meraz cepillo de dientes 24 horas después de giovanni tomado antibióticos.    Acuda a la consulta de control con meraz médico según las indicaciones: Anote baldemar preguntas para que se acuerde de hacerlas camilla baldemar visitas.  © Copyright Merative 2023 Information is for End User's use only and may not be sold, redistributed or otherwise used for commercial purposes.  Esta información es sólo para uso en educación. Meraz intención no es darle un consejo médico sobre enfermedades o tratamientos. Colsulte con meraz médico, enfermera o farmacéutico antes de seguir cualquier  régimen médico para saber si es seguro y efectivo para usted.        Follow up with PCP in 3-5 days.  Proceed to  ER if symptoms worsen.    Chief Complaint     Chief Complaint   Patient presents with    Cold Like Symptoms     Started at midnight with chills, pain to the right side, headache, sore throat and weak         History of Present Illness       Patient is a 43-year-old female who presents the clinic for fevers, chills, body aches, general fatigue, and sweating with dizziness since last night.  She woke up today with 102 fever.  Her daughter states that she did not take anything for her fever.  She is complaining of a sore throat and generalized weakness        Review of Systems   Review of Systems   Constitutional:  Positive for activity change, chills, fatigue and fever.   HENT:  Positive for congestion, rhinorrhea and sore throat. Negative for dental problem, drooling, ear discharge, ear pain, hearing loss, mouth sores and sinus pain.    Eyes:  Negative for pain and visual disturbance.   Respiratory:  Positive for cough. Negative for apnea, chest tightness and shortness of breath.    Cardiovascular:  Negative for chest pain and palpitations.   Gastrointestinal:  Negative for abdominal pain and vomiting.   Genitourinary:  Negative for dysuria and hematuria.   Musculoskeletal:  Negative for arthralgias and back pain.   Skin:  Negative for color change and rash.   Neurological:  Positive for dizziness, facial asymmetry, light-headedness and headaches. Negative for seizures and syncope.   All other systems reviewed and are negative.        Current Medications       Current Outpatient Medications:     amoxicillin (AMOXIL) 500 mg capsule, Take 1 capsule (500 mg total) by mouth every 12 (twelve) hours for 10 days, Disp: 20 capsule, Rfl: 0  No current facility-administered medications for this visit.    Current Allergies     Allergies as of 04/19/2024    (No Known Allergies)            The following portions of  the patient's history were reviewed and updated as appropriate: allergies, current medications, past family history, past medical history, past social history, past surgical history and problem list.     History reviewed. No pertinent past medical history.    History reviewed. No pertinent surgical history.    History reviewed. No pertinent family history.      Medications have been verified.        Objective   BP (!) 83/52   Pulse 64   Temp (!) 102 °F (38.9 °C)   Resp 20   Wt 63.9 kg (140 lb 12.8 oz)   SpO2 99%        Physical Exam     Physical Exam  Constitutional:       Appearance: She is well-developed. She is ill-appearing and diaphoretic. She is not toxic-appearing.   HENT:      Head: Normocephalic.      Right Ear: Tympanic membrane normal.      Left Ear: Tympanic membrane normal.      Nose: Congestion and rhinorrhea present.      Mouth/Throat:      Pharynx: Oropharyngeal exudate and posterior oropharyngeal erythema present.      Tonsils: 3+ on the right. 3+ on the left.   Eyes:      General:         Right eye: No discharge.         Left eye: No discharge.      Pupils: Pupils are equal, round, and reactive to light.   Neck:      Thyroid: No thyromegaly.   Cardiovascular:      Rate and Rhythm: Normal rate.      Heart sounds: No murmur heard.  Pulmonary:      Effort: Pulmonary effort is normal. No respiratory distress.      Breath sounds: Rhonchi present. No wheezing or rales.   Chest:      Chest wall: No tenderness.   Abdominal:      General: There is no distension.      Palpations: Abdomen is soft.      Tenderness: There is no abdominal tenderness. There is no guarding or rebound.   Musculoskeletal:         General: Normal range of motion.      Cervical back: Normal range of motion.   Lymphadenopathy:      Cervical: Cervical adenopathy present.      Right cervical: Superficial cervical adenopathy and deep cervical adenopathy present.      Left cervical: Superficial cervical adenopathy and deep cervical  adenopathy present.   Skin:     General: Skin is warm.   Neurological:      Mental Status: She is alert and oriented to person, place, and time.             -She was given Tylenol in the clinic for the fever.  I suggest she go to the ER if her symptoms worsen.  I will treat her with amoxicillin.  I urged her to push fluids.

## 2024-05-17 ENCOUNTER — DOCTOR'S OFFICE (OUTPATIENT)
Dept: URBAN - NONMETROPOLITAN AREA CLINIC 1 | Facility: CLINIC | Age: 44
Setting detail: OPHTHALMOLOGY
End: 2024-05-17
Payer: COMMERCIAL

## 2024-05-17 DIAGNOSIS — H35.373: ICD-10-CM

## 2024-05-17 DIAGNOSIS — H04.123: ICD-10-CM

## 2024-05-17 DIAGNOSIS — H11.023: ICD-10-CM

## 2024-05-17 DIAGNOSIS — H11.003: ICD-10-CM

## 2024-05-17 DIAGNOSIS — H04.121: ICD-10-CM

## 2024-05-17 DIAGNOSIS — H10.13: ICD-10-CM

## 2024-05-17 PROCEDURE — 92014 COMPRE OPH EXAM EST PT 1/>: CPT | Performed by: OPHTHALMOLOGY

## 2024-05-17 PROCEDURE — 83861 MICROFLUID ANALY TEARS: CPT | Mod: RT | Performed by: OPHTHALMOLOGY

## 2024-05-17 PROCEDURE — 92134 CPTRZ OPH DX IMG PST SGM RTA: CPT | Performed by: OPHTHALMOLOGY

## 2024-05-17 ASSESSMENT — CONFRONTATIONAL VISUAL FIELD TEST (CVF)
OS_FINDINGS: FULL
OD_FINDINGS: FULL

## 2024-05-21 ENCOUNTER — DOCTOR'S OFFICE (OUTPATIENT)
Dept: URBAN - NONMETROPOLITAN AREA CLINIC 1 | Facility: CLINIC | Age: 44
Setting detail: OPHTHALMOLOGY
End: 2024-05-21
Payer: COMMERCIAL

## 2024-05-21 ENCOUNTER — OPTICAL OFFICE (OUTPATIENT)
Dept: URBAN - NONMETROPOLITAN AREA CLINIC 4 | Facility: CLINIC | Age: 44
Setting detail: OPHTHALMOLOGY
End: 2024-05-21

## 2024-05-21 DIAGNOSIS — H52.4: ICD-10-CM

## 2024-05-21 DIAGNOSIS — H52.03: ICD-10-CM

## 2024-05-21 PROBLEM — H11.003 PTERYGIUM; BOTH EYES: Status: ACTIVE | Noted: 2024-05-17

## 2024-05-21 PROBLEM — H04.122 DRY EYE; RIGHT EYE, LEFT EYE: Status: ACTIVE | Noted: 2024-05-17

## 2024-05-21 PROBLEM — H04.121 DRY EYE; RIGHT EYE, LEFT EYE: Status: ACTIVE | Noted: 2024-05-17

## 2024-05-21 PROCEDURE — V2781 PROGRESSIVE LENS PER LENS: HCPCS | Performed by: OPTOMETRIST

## 2024-05-21 PROCEDURE — 92012 INTRM OPH EXAM EST PATIENT: CPT | Performed by: OPTOMETRIST

## 2024-05-21 PROCEDURE — V2781 PROGRESSIVE LENS PER LENS: HCPCS | Mod: LT | Performed by: OPTOMETRIST

## 2024-05-21 PROCEDURE — V2744 TINT PHOTOCHROMATIC LENS/ES: HCPCS | Mod: LT | Performed by: OPTOMETRIST

## 2024-05-21 PROCEDURE — V2744 TINT PHOTOCHROMATIC LENS/ES: HCPCS | Performed by: OPTOMETRIST

## 2024-05-21 PROCEDURE — V2020 VISION SVCS FRAMES PURCHASES: HCPCS | Performed by: OPTOMETRIST

## 2024-05-21 ASSESSMENT — CONFRONTATIONAL VISUAL FIELD TEST (CVF)
OD_FINDINGS: FULL
OS_FINDINGS: FULL

## 2024-09-16 ENCOUNTER — DOCTOR'S OFFICE (OUTPATIENT)
Dept: URBAN - NONMETROPOLITAN AREA CLINIC 1 | Facility: CLINIC | Age: 44
Setting detail: OPHTHALMOLOGY
End: 2024-09-16
Payer: COMMERCIAL

## 2024-09-16 ENCOUNTER — RX ONLY (RX ONLY)
Age: 44
End: 2024-09-16

## 2024-09-16 DIAGNOSIS — H04.123: ICD-10-CM

## 2024-09-16 DIAGNOSIS — H11.023: ICD-10-CM

## 2024-09-16 DIAGNOSIS — H11.003: ICD-10-CM

## 2024-09-16 PROCEDURE — 99213 OFFICE O/P EST LOW 20 MIN: CPT | Performed by: OPHTHALMOLOGY

## 2024-09-16 ASSESSMENT — REFRACTION_MANIFEST
OS_AXIS: 175
OS_ADD: +1.25
OD_ADD: +1.25
OD_SPHERE: +0.50
OS_CYLINDER: -1.00
OS_VA1: 20/25+2
OD_CYLINDER: SPH
OD_VA2: 20/20-2
OD_VA1: 20/20-2
OS_SPHERE: +0.75
OS_VA2: 20/25+2

## 2024-09-16 ASSESSMENT — KERATOMETRY
OD_K2POWER_DIOPTERS: 7.69
OS_AXISANGLE_DEGREES: 014
OS_K1POWER_DIOPTERS: 7.43
OD_AXISANGLE_DEGREES: 092
OD_K1POWER_DIOPTERS: 7.91
OS_K2POWER_DIOPTERS: 7.27

## 2024-09-16 ASSESSMENT — REFRACTION_CURRENTRX
OS_AXIS: 170
OD_CYLINDER: 0.00
OD_OVR_VA: 20/
OD_ADD: +1.00
OD_AXIS: 180
OS_VPRISM_DIRECTION: BF
OD_VPRISM_DIRECTION: BF
OS_OVR_VA: 20/
OS_ADD: +1.00
OD_SPHERE: +0.50
OS_SPHERE: +0.75
OS_CYLINDER: -1.00

## 2024-09-16 ASSESSMENT — CORNEAL PTERYGIUM
OS_PTERYGIUM: NASAL 2MM
OD_PTERYGIUM: NASAL 1MM

## 2024-09-16 ASSESSMENT — CONFRONTATIONAL VISUAL FIELD TEST (CVF)
OD_FINDINGS: FULL
OS_FINDINGS: FULL

## 2024-09-16 ASSESSMENT — REFRACTION_AUTOREFRACTION
OD_SPHERE: +0.25
OS_SPHERE: 0.00

## 2024-09-16 ASSESSMENT — VISUAL ACUITY
OS_BCVA: 20/25-2
OD_BCVA: 20/40-2

## 2024-10-08 ENCOUNTER — APPOINTMENT (RX ONLY)
Dept: URBAN - NONMETROPOLITAN AREA CLINIC 4 | Facility: CLINIC | Age: 44
Setting detail: DERMATOLOGY
End: 2024-10-08

## 2024-10-08 DIAGNOSIS — L98.8 OTHER SPECIFIED DISORDERS OF THE SKIN AND SUBCUTANEOUS TISSUE: ICD-10-CM

## 2024-10-08 PROCEDURE — ? COUNSELING

## 2024-10-08 PROCEDURE — ? BOTOX (U OR CC)

## 2024-10-08 PROCEDURE — ? PHOTO-DOCUMENTATION

## 2024-10-08 ASSESSMENT — LOCATION DETAILED DESCRIPTION DERM
LOCATION DETAILED: LEFT CENTRAL TEMPLE
LOCATION DETAILED: INFERIOR MID FOREHEAD
LOCATION DETAILED: RIGHT MEDIAL TEMPLE

## 2024-10-08 ASSESSMENT — LOCATION SIMPLE DESCRIPTION DERM
LOCATION SIMPLE: LEFT TEMPLE
LOCATION SIMPLE: RIGHT TEMPLE
LOCATION SIMPLE: INFERIOR FOREHEAD

## 2024-10-08 ASSESSMENT — LOCATION ZONE DERM: LOCATION ZONE: FACE

## 2024-10-08 NOTE — PROCEDURE: BOTOX (U OR CC)
Reconstitution Date (Optional): 10/1/24
Detail Level: Zone
Expiration Date (Month Year): 9/26
Dilution (U/0.1 Cc): 4
Price Per Unit Or Per Cc In $ (Use Numbers Only, No Special Characters Or $): 15.50
Consent: Written consent obtained. Risks include but not limited to lid/brow ptosis, bruising, swelling, diplopia, temporary effect, incomplete chemical denervation.
Lot #: [id number]
Additional Area 1 Location: 24
Additional Area 2 Location: 12
Post-Care Instructions: Patient instructed to not lie down for 4 hours and limit physical activity for 24 hours.  If headache occurs, ok to take tylenol or extra strength tylenol as directed.
Measure In Units Or Cc's?: units
Quantity Per Injection Site (Units Or Cc): 6

## 2024-10-23 ENCOUNTER — APPOINTMENT (RX ONLY)
Dept: URBAN - NONMETROPOLITAN AREA CLINIC 4 | Facility: CLINIC | Age: 44
Setting detail: DERMATOLOGY
End: 2024-10-23

## 2024-10-23 DIAGNOSIS — Z41.9 ENCOUNTER FOR PROCEDURE FOR PURPOSES OTHER THAN REMEDYING HEALTH STATE, UNSPECIFIED: ICD-10-CM | Status: IMPROVED

## 2024-10-23 PROCEDURE — ? COSMETIC FOLLOW-UP

## 2024-10-23 PROCEDURE — ? PHOTO-DOCUMENTATION

## 2024-10-23 ASSESSMENT — LOCATION SIMPLE DESCRIPTION DERM: LOCATION SIMPLE: GLABELLA

## 2024-10-23 ASSESSMENT — LOCATION DETAILED DESCRIPTION DERM: LOCATION DETAILED: GLABELLA

## 2024-10-23 ASSESSMENT — LOCATION ZONE DERM: LOCATION ZONE: FACE

## 2024-10-23 NOTE — PROCEDURE: COSMETIC FOLLOW-UP
Detail Level: Zone
Treatment (Optional): Botox
Patient Satisfaction: Very pleased
Side Effects Or Complications: None
Price (Use Numbers Only, No Special Characters Or $): 0
Comments (Free Text): Good results.
Global Improvement: Very Good

## 2025-02-20 ENCOUNTER — APPOINTMENT (OUTPATIENT)
Dept: URBAN - NONMETROPOLITAN AREA CLINIC 4 | Facility: CLINIC | Age: 45
Setting detail: DERMATOLOGY
End: 2025-02-20

## 2025-02-20 DIAGNOSIS — L98.8 OTHER SPECIFIED DISORDERS OF THE SKIN AND SUBCUTANEOUS TISSUE: ICD-10-CM

## 2025-02-20 PROCEDURE — ? BOTOX (U OR CC)

## 2025-02-20 PROCEDURE — ? COUNSELING

## 2025-02-20 PROCEDURE — ? PHOTO-DOCUMENTATION

## 2025-02-20 ASSESSMENT — LOCATION DETAILED DESCRIPTION DERM
LOCATION DETAILED: INFERIOR MID FOREHEAD
LOCATION DETAILED: RIGHT LATERAL CANTHUS
LOCATION DETAILED: LEFT LATERAL CANTHUS

## 2025-02-20 ASSESSMENT — LOCATION ZONE DERM
LOCATION ZONE: EYELID
LOCATION ZONE: FACE

## 2025-02-20 ASSESSMENT — LOCATION SIMPLE DESCRIPTION DERM
LOCATION SIMPLE: LEFT EYELID
LOCATION SIMPLE: INFERIOR FOREHEAD
LOCATION SIMPLE: RIGHT EYELID

## 2025-02-20 NOTE — PROCEDURE: BOTOX (U OR CC)
Measure In Units Or Cc's?: units
Post-Care Instructions: Patient instructed to not lie down for 4 hours and limit physical activity for 24 hours. Patient instructed not to travel by airplane for 48 hours.
Additional Area 1 Location: 16
Additional Area 2 Location: 8
Detail Level: Detailed
Consent: Written consent obtained. Risks include but not limited to lid/brow ptosis, bruising, swelling, diplopia, temporary effect, incomplete chemical denervation.
Dilution (U/0.1 Cc): 4
Price Per Unit Or Per Cc In $ (Use Numbers Only, No Special Characters Or $): 15.50
Quantity Per Injection Site (Units Or Cc): 24
Quantity Per Injection Site (Units Or Cc): 6

## 2025-03-10 ENCOUNTER — APPOINTMENT (OUTPATIENT)
Dept: URBAN - NONMETROPOLITAN AREA CLINIC 4 | Facility: CLINIC | Age: 45
Setting detail: DERMATOLOGY
End: 2025-03-10

## 2025-03-10 DIAGNOSIS — Z41.9 ENCOUNTER FOR PROCEDURE FOR PURPOSES OTHER THAN REMEDYING HEALTH STATE, UNSPECIFIED: ICD-10-CM | Status: IMPROVED

## 2025-03-10 PROCEDURE — ? COSMETIC FOLLOW-UP

## 2025-04-15 ENCOUNTER — OFFICE VISIT (OUTPATIENT)
Dept: FAMILY MEDICINE CLINIC | Facility: CLINIC | Age: 45
End: 2025-04-15
Payer: COMMERCIAL

## 2025-04-15 VITALS
BODY MASS INDEX: 31.33 KG/M2 | OXYGEN SATURATION: 98 % | TEMPERATURE: 97.3 F | SYSTOLIC BLOOD PRESSURE: 102 MMHG | HEIGHT: 59 IN | DIASTOLIC BLOOD PRESSURE: 64 MMHG | HEART RATE: 85 BPM | WEIGHT: 155.4 LBS

## 2025-04-15 DIAGNOSIS — Z12.11 SCREENING FOR COLORECTAL CANCER: ICD-10-CM

## 2025-04-15 DIAGNOSIS — Z00.00 ANNUAL PHYSICAL EXAM: Primary | ICD-10-CM

## 2025-04-15 DIAGNOSIS — R20.2 NUMBNESS AND TINGLING IN LEFT HAND: ICD-10-CM

## 2025-04-15 DIAGNOSIS — R20.0 NUMBNESS AND TINGLING IN LEFT HAND: ICD-10-CM

## 2025-04-15 DIAGNOSIS — Z86.39 HISTORY OF VITAMIN D DEFICIENCY: ICD-10-CM

## 2025-04-15 DIAGNOSIS — Z12.31 ENCOUNTER FOR SCREENING MAMMOGRAM FOR BREAST CANCER: ICD-10-CM

## 2025-04-15 DIAGNOSIS — Z86.2 HISTORY OF IRON DEFICIENCY ANEMIA: ICD-10-CM

## 2025-04-15 DIAGNOSIS — Z13.1 SCREENING FOR DIABETES MELLITUS: ICD-10-CM

## 2025-04-15 DIAGNOSIS — Z13.220 SCREENING FOR LIPID DISORDERS: ICD-10-CM

## 2025-04-15 DIAGNOSIS — Z11.4 SCREENING FOR HIV (HUMAN IMMUNODEFICIENCY VIRUS): ICD-10-CM

## 2025-04-15 DIAGNOSIS — Z12.12 SCREENING FOR COLORECTAL CANCER: ICD-10-CM

## 2025-04-15 DIAGNOSIS — Z13.29 SCREENING FOR THYROID DISORDER: ICD-10-CM

## 2025-04-15 PROCEDURE — 99214 OFFICE O/P EST MOD 30 MIN: CPT | Performed by: PHYSICIAN ASSISTANT

## 2025-04-15 PROCEDURE — 99386 PREV VISIT NEW AGE 40-64: CPT | Performed by: PHYSICIAN ASSISTANT

## 2025-04-15 RX ORDER — SUCRALFATE ORAL 1 G/10ML
10 SUSPENSION ORAL 2 TIMES DAILY PRN
COMMUNITY
Start: 2025-04-09

## 2025-04-15 RX ORDER — OMEPRAZOLE 40 MG/1
CAPSULE, DELAYED RELEASE ORAL
COMMUNITY
Start: 2025-04-09

## 2025-04-15 RX ORDER — OXYBUTYNIN CHLORIDE 10 MG/1
10 TABLET, EXTENDED RELEASE ORAL DAILY
COMMUNITY
Start: 2025-04-10 | End: 2026-04-10

## 2025-04-15 NOTE — PROGRESS NOTES
Adult Annual Physical  Name: Yareli Yan      : 1980      MRN: [id number]  Encounter Provider: Jonn Medrano PA-C  Encounter Date: 4/15/2025   Encounter department: [facility]    :  Assessment & Plan  Annual physical exam         Encounter for screening mammogram for breast cancer    Orders:    Mammo screening bilateral w 3d and cad; Future    Screening for colorectal cancer    Orders:    Ambulatory referral to Gastroenterology; Future    History of vitamin D deficiency    Orders:    Vitamin D 25 hydroxy; Future    History of iron deficiency anemia    Orders:    CBC and differential; Future    Iron Panel (Includes Ferritin, Iron Sat%, Iron, and TIBC); Future    Screening for HIV (human immunodeficiency virus)    Orders:    HIV 1/2 AG/AB w Reflex [facility] for 2 yr old and above; Future    Numbness and tingling in left hand     - Pt notes 3 week onset of intermittent hand cramping, numbness/tingling sensation of left hand    - Denies any known mechanism of injury, trauma, fall    - States her hand cramps up intermittently at night and has associated numbness/ tingling sensation    - Has not trialed treatment   - Physical exam revealed wrist/hand at 5/5 strength, no areas of swelling, erythema, or TTP. Phalen and Tinel testing negative, but still suspect neuropathy contributing. Will collect EMG and for interim recommended NSAID use PRN for pain control, wrist brace applied nightly    - Will recheck in 1 month   Orders:    EMG; Future    Screening for lipid disorders    Orders:    Lipid Panel with Direct LDL reflex; Future    Screening for thyroid disorder    Orders:    TSH, 3rd generation; Future    T4, free; Future    Screening for diabetes mellitus    Orders:    Comprehensive metabolic panel; Future    Hemoglobin A1C; Future    Annual physical exam         Encounter for screening mammogram for breast cancer         Screening for colorectal cancer         Patient is a 44 year old  female PMH iron deficiency anemia, vitamin D deficiency presenting to establish care. Management as described above.     Patient to return in 4 weeks to recheck hand numbness/tingling, review labs.       Preventive Screenings:  - Diabetes Screening: risks/benefits discussed and orders placed  - Cholesterol Screening: risks/benefits discussed and orders placed   - Hepatitis C screening: screening up-to-date   - HIV screening: risks/benefits discussed and patient agrees to screening   - Cervical cancer screening: has hysterectomy   - Breast cancer screening: risks/benefits discussed and orders placed   - Colon cancer screening: risks/benefits discussed and orders placed   - Lung cancer screening: screening not indicated     Immunizations:  - Immunizations due: Influenza and Prevnar 20  - Risks/benefits immunizations discussed    - The patient declines recommended vaccines currently despite my recommendations      Counseling/Anticipatory Guidance:    - Diet: discussed recommendations for a healthy/well-balanced diet.   - Exercise: the importance of regular exercise/physical activity was discussed. Recommend exercise 3-5 times per week for at least 30 minutes.       Depression Screening and Follow-up Plan: Patient was screened for depression during today's encounter. They screened negative with a PHQ-2 score of 0.          History of Present Illness     Adult Annual Physical:  Patient presents for annual physical.     Diet and Physical Activity:  - Diet/Nutrition: no special diet.  - Exercise: walking, moderate cardiovascular exercise, 3-4 times a week on average and 30-60 minutes on average.    Depression Screening:  - PHQ-2 Score: 0    General Health:  - Sleep: 4-6 hours of sleep on average and sleeps well.  - Hearing: normal hearing bilateral ears.  - Vision: most recent eye exam < 1 year ago, wears glasses and no vision problems.  - Dental: regular dental visits, brushes teeth twice daily and floss  "regularly.    /GYN Health:  - Follows with GYN: yes.   - History of STDs: no  - Contraception: hysterectomy.      Advanced Care Planning:  - Has an advanced directive?: no    - Has a durable medical POA?: no      Review of Systems   Constitutional:  Negative for fatigue and fever.   Respiratory:  Negative for cough and shortness of breath.    Cardiovascular:  Negative for chest pain.   Gastrointestinal:  Negative for diarrhea, nausea and vomiting.   Neurological:  Positive for numbness. Negative for headaches.     Medical History Reviewed by provider this encounter:  Tobacco  Allergies  Meds  Problems  Med Hx  Surg Hx  Fam Hx     .    Objective   /64 (BP Location: Left arm, Patient Position: Sitting, Cuff Size: Adult)   Pulse 85   Temp (!) 97.3 °F (36.3 °C) (Tympanic)   Ht 4' 11\" (1.499 m)   Wt 70.5 kg (155 lb 6.4 oz)   SpO2 98%   BMI 31.39 kg/m²     Physical Exam  Constitutional:       Appearance: Normal appearance.   HENT:      Head: Normocephalic.      Right Ear: External ear normal.      Left Ear: External ear normal.      Nose: Nose normal.      Mouth/Throat:      Mouth: Mucous membranes are moist.      Pharynx: Oropharynx is clear.   Eyes:      Conjunctiva/sclera: Conjunctivae normal.   Cardiovascular:      Rate and Rhythm: Normal rate and regular rhythm.      Heart sounds: Normal heart sounds.   Pulmonary:      Effort: Pulmonary effort is normal.      Breath sounds: Normal breath sounds.   Abdominal:      General: Bowel sounds are normal.      Palpations: Abdomen is soft.   Musculoskeletal:      Right forearm: Normal.      Left forearm: Normal.      Right wrist: Normal. No tenderness. Normal range of motion.      Left wrist: Normal. No tenderness. Normal range of motion.      Right hand: Normal. No deformity, tenderness or bony tenderness. Normal range of motion.      Left hand: Normal. No deformity, tenderness or bony tenderness. Normal range of motion.      Comments: Phalen and tinel " testing negative bilat    Neurological:      Mental Status: She is alert and oriented to person, place, and time.   Psychiatric:         Behavior: Behavior normal.

## 2025-04-15 NOTE — PATIENT INSTRUCTIONS
"Patient Education     Routine physical for adults   The Basics   Written by the doctors and editors at [facility]   What is a physical? -- A physical is a routine visit, or \"check-up,\" with your doctor. You might also hear it called a \"wellness visit\" or \"preventive visit.\"  During each visit, the doctor will:   Ask about your physical and mental health   Ask about your habits, behaviors, and lifestyle   Do an exam   Give you vaccines if needed   Talk to you about any medicines you take   Give advice about your health   Answer your questions  Getting regular check-ups is an important part of taking care of your health. It can help your doctor find and treat any problems you have. But it's also important for preventing health problems.  A routine physical is different from a \"sick visit.\" A sick visit is when you see a doctor because of a health concern or problem. Since physicals are scheduled ahead of time, you can think about what you want to ask the doctor.  How often should I get a physical? -- It depends on your age and health. In general, for people age 21 years and older:   If you are younger than 50 years, you might be able to get a physical every 3 years.   If you are 50 years or older, your doctor might recommend a physical every year.  If you have an ongoing health condition, like diabetes or high blood pressure, your doctor will probably want to see you more often.  What happens during a physical? -- In general, each visit will include:   Physical exam - The doctor or nurse will check your height, weight, heart rate, and blood pressure. They will also look at your eyes and ears. They will ask about how you are feeling and whether you have any symptoms that bother you.   Medicines - It's a good idea to bring a list of all the medicines you take to each doctor visit. Your doctor will talk to you about your medicines and answer any questions. Tell them if you are having any side effects that bother you. You " "should also tell them if you are having trouble paying for any of your medicines.   Habits and behaviors - This includes:   Your diet   Your exercise habits   Whether you smoke, drink alcohol, or use drugs   Whether you are sexually active   Whether you feel safe at home  Your doctor will talk to you about things you can do to improve your health and lower your risk of health problems. They will also offer help and support. For example, if you want to quit smoking, they can give you advice and might prescribe medicines. If you want to improve your diet or get more physical activity, they can help you with this, too.   Lab tests, if needed - The tests you get will depend on your age and situation. For example, your doctor might want to check your:   Cholesterol   Blood sugar   Iron level   Vaccines - The recommended vaccines will depend on your age, health, and what vaccines you already had. Vaccines are very important because they can prevent certain serious or deadly infections.   Discussion of screening - \"Screening\" means checking for diseases or other health problems before they cause symptoms. Your doctor can recommend screening based on your age, risk, and preferences. This might include tests to check for:   Cancer, such as breast, prostate, cervical, ovarian, colorectal, prostate, lung, or skin cancer   Sexually transmitted infections, such as chlamydia and gonorrhea   Mental health conditions like depression and anxiety  Your doctor will talk to you about the different types of screening tests. They can help you decide which screenings to have. They can also explain what the results might mean.   Answering questions - The physical is a good time to ask the doctor or nurse questions about your health. If needed, they can refer you to other doctors or specialists, too.  Adults older than 65 years often need other care, too. As you get older, your doctor will talk to you about:   How to prevent falling at " home   Hearing or vision tests   Memory testing   How to take your medicines safely   Making sure that you have the help and support you need at home  All topics are updated as new evidence becomes available and our peer review process is complete.  This topic retrieved from [employer] on: May 02, 2024.  Topic 165354 Version 1.0  Release: 32.4.3 - C32.122  © 2024 UpToDate, Inc. and/or its affiliates. All rights reserved.  Consumer Information Use and Disclaimer   Disclaimer: This generalized information is a limited summary of diagnosis, treatment, and/or medication information. It is not meant to be comprehensive and should be used as a tool to help the user understand and/or assess potential diagnostic and treatment options. It does NOT include all information about conditions, treatments, medications, side effects, or risks that may apply to a specific patient. It is not intended to be medical advice or a substitute for the medical advice, diagnosis, or treatment of a health care provider based on the health care provider's examination and assessment of a patient's specific and unique circumstances. Patients must speak with a health care provider for complete information about their health, medical questions, and treatment options, including any risks or benefits regarding use of medications. This information does not endorse any treatments or medications as safe, effective, or approved for treating a specific patient. UpToDate, Inc. and its affiliates disclaim any warranty or liability relating to this information or the use thereof.The use of this information is governed by the Terms of Use, available at https://www.woltersLeCabuwer.com/en/know/clinical-effectiveness-terms. 2024© UpToDate, Inc. and its affiliates and/or licensors. All rights reserved.  Copyright   © 2024 UpToDate, Inc. and/or its affiliates. All rights reserved.    Patient Education     Examen físico de rutina para adultos   Conceptos Básicos  "  Redactado por los médicos y editores de [employer]   ¿Qué es un examen físico? -- Un examen físico es rl consulta de rutina o \"revisión\" con meraz médico. También se conoce chintan \"consulta de bienestar\" o \"consulta preventiva\".  Camilla cada consulta, el médico hará lo siguiente:   Preguntará por meraz clay física y mental   Preguntará sobre baldemar hábitos, conductas y estilo de vanita   Le hará un examen   Le administrará las vacunas que aj necesarias   Hablará con usted sobre cualquier medicina que tome   Le dará consejos sobre meraz clay   Responderá baldemar preguntas  Hacerse revisiones periódicas es rl parte importante del cuidado de meraz clay. Puede ayudar a meraz médico a encontrar y tratar cualquier problema que tenga. [place] también es importante para prevenir problemas de clay.  Un examen físico de rutina es diferente de rl \"consulta por enfermedad\". Rl consulta por enfermedad es cuando lo atiende un médico debido a un problema o inquietud de clay. Dado que los exámenes físicos se programan con anticipación, usted puede pensar en lo que quiere preguntarle al médico.  ¿Con qué frecuencia yousif hacerme un examen físico? -- Depende de meraz edad y de meraz estado de clay. En general, en el justin de las personas mayores de 21 años:   Si tiene menos de 50 años, es posible que pueda hacerse un examen físico cada 3 años.   Si tiene 50 años o más, meraz médico podría recomendarle un examen físico cada año.  Si tiene un padecimiento de clay crónico, kathya chintan diabetes o presión arterial rafael, meraz médico probablemente querrá verlo con más frecuencia.  ¿Qué sucede camilla un examen físico? -- En general, cada consulta incluirá:   Examen físico - El médico o enfermero revisará meraz estatura, peso, frecuencia cardíaca y presión arterial. También le examinará los ojos y los oídos. Le preguntará cómo se siente y si tiene algún síntoma que le moleste.   Medicinas - Es rl buena idea llevar rl lista de todos las medicinas que keyla cada vez que acude " "a la consulta médica. Meraz médico le hablará sobre lindsey medicinas y responderá a lindsey preguntas. Dígale si tiene algún efecto secundario que le moleste. También debe informarle si tiene dificultades para pagar alguna de lindsey medicinas.   Hábitos y comportamientos - [place] incluye:   Meraz dieta   Lindsey hábitos de ejercicio   Si fuma, dominique alcohol o consume drogas   Si es sexualmente activo   Si se siente seguro en casa  Meraz médico hablará con usted sobre las cosas que puede hacer para mejorar meraz clay y reducir el riesgo de tener problemas de clay. También ofrecerá ayuda y apoyo. Por ejemplo, si quiere dejar de fumar, puede darle consejos y recetarle medicinas. Si quiere mejorar meraz alimentación o realizar más actividad física, meraz médico también puede ayudarlo a lograr estos objetivos.   Pruebas de laboratorio, si son necesarias - Las pruebas que le realicen dependerán de meraz edad y situación. Por ejemplo, es posible que meraz médico quiera revisar meraz:   Colesterol   Azúcar en tony   Nivel de leonel   Vacunas - Las vacunas recomendadas dependerán de meraz edad, meraz clay y de las vacunas que ya haya recibido. Las vacunas son muy importantes porque pueden prevenir ciertas infecciones graves o mortales.   Análisis sobre las pruebas de detección - \"Detección\" significa revisar si hay enfermedades u otros problemas de clay antes de que causen síntomas. Meraz médico puede recomendar pruebas de detección según meraz edad, riesgo y preferencias. [place] podría incluir pruebas para detectar:   Cáncer, chintan cáncer de seno, próstata, shane uterino, ovario, colorrectal, próstata, pulmón o piel   Infecciones de transmisión sexual tales chintan clamidia y gonorrea   Padecimientos de clay mental tales chintan depresión y ansiedad.  El médico le hablará sobre los diferentes tipos de pruebas de detección. Puede ayudarlo a decidir qué pruebas de detección debe hacerse. También le puede explicar lo que podrían significar los resultados.   Responder preguntas - El " examen físico es un buen momento para hacerle preguntas al médico o enfermero sobre meraz clay. Si es necesario, también puede derivarlo a otros médicos o especialistas.  Los adultos mayores de 65 años a menudo también necesitan otros cuidados. A medida que envejece, meraz médico hablará con usted sobre:   Cómo evitar las caídas en el hogar   Pruebas de audición o visión   Pruebas de memoria   Cómo berenice baldemar medicinas de manera zaidi   Asegurarse de tener la ayuda y el apoyo que necesita en casa  Todos los artículos se actualizan a medida que se descubre nueva evidencia y culmina nuestro proceso de evaluación por homólogos   Han artículo se recuperó de [employer] el: May 02, 2024.  Artículo [id number] Versión [id number].[id number]  Release: 32.4.3 - C32.122  © 2024 [employer]Date, Inc. Todos los derechos reservados.  Exención de responsabilidad y uso de la información del consumidor   Descargo de responsabilidad: esta información generalizada es un resumen limitado de información sobre el diagnóstico, el tratamiento y/o los medicamentos. No pretende ser exhaustiva y se debe utilizar chintan herramienta para ayudar al usuario a comprender y/o evaluar las posibles opciones de diagnóstico y tratamiento. No incluye toda la información sobre afecciones, tratamientos, medicamentos, efectos secundarios o riesgos puedan ser aplicables a un paciente específico. No tiene el propósito de servir chintan recomendación médica ni de sustituir la recomendación médica, el diagnóstico o el tratamiento de un profesional de atención médica que se base en el examen y la evaluación de han profesional de la clay respecto a las circunstancias específicas y únicas del paciente. Los pacientes deben hablar con un profesional de atención médica para obtener información completa sobre meraz clay, cuestiones médicas y opciones de tratamiento, incluidos los riesgos o los beneficios relacionados con el uso de medicamentos. Esta información no certifica que los tratamientos o  medicamentos aj seguros, eficaces o estén aprobados para tratar a un paciente específico. [employer] y baldemar afiliados renuncian a cualquier garantía o responsabilidad relacionada con esta información o el uso de la misma.El uso de esta información está sujeto a las Condiciones de uso, disponibles en https://www.Vascular Closureuwer.com/en/know/clinical-effectiveness-terms. 2024© [employer] y baldemar afiliados y/o licenciantes. Todos los derechos reservados.  Copyright   © 2024 [employer] Todos los derechos reservados.

## 2025-04-16 ENCOUNTER — PREP FOR PROCEDURE (OUTPATIENT)
Age: 45
End: 2025-04-16

## 2025-04-16 ENCOUNTER — TELEPHONE (OUTPATIENT)
Age: 45
End: 2025-04-16

## 2025-04-16 DIAGNOSIS — Z12.11 SCREENING FOR COLON CANCER: Primary | ICD-10-CM

## 2025-04-16 NOTE — TELEPHONE ENCOUNTER
Scheduled date of colonoscopy (as of today):05.07.25    Physician performing colonoscopy:Darron    Location of colonoscopy:[place]    Bowel prep reviewed with patient:James/Eric    Instructions reviewed with patient by:Lsims and requesting postal

## 2025-04-16 NOTE — TELEPHONE ENCOUNTER
Pt is scheduled for a colonoscopy for 05.07.25, please send her the James/Dulc prep, in Belarusian, to her home address, verified as correct.

## 2025-04-16 NOTE — TELEPHONE ENCOUNTER
04/16/25  Screened by: Natan Higgins    Referring Provider     Pre- Screening:     There is no height or weight on file to calculate BMI.  Has patient been referred for a routine screening Colonoscopy? yes  Is the patient between 45-75 years old? yes    Previous Colonoscopy no    Does the patient want to see a Gastroenterologist prior to their procedure OR are they having any GI symptoms? no    Has the patient been hospitalized or had abdominal surgery in the past 6 months? no    Does the patient use supplemental oxygen? no    Does the patient take Coumadin, Lovenox, Plavix, Elliquis, Xarelto, or other blood thinning medication? no    Has the patient had a stroke, cardiac event, or stent placed in the past year? no    If patient is between 45yrs - 49yrs, please advise patient that we will have to confirm benefits & coverage with their insurance company for a routine screening colonoscopy.

## 2025-04-17 ENCOUNTER — APPOINTMENT (OUTPATIENT)
Dept: LAB | Facility: CLINIC | Age: 45
End: 2025-04-17
Attending: PHYSICIAN ASSISTANT
Payer: COMMERCIAL

## 2025-04-17 DIAGNOSIS — Z86.39 HISTORY OF VITAMIN D DEFICIENCY: ICD-10-CM

## 2025-04-17 DIAGNOSIS — Z13.29 SCREENING FOR THYROID DISORDER: ICD-10-CM

## 2025-04-17 DIAGNOSIS — Z13.1 SCREENING FOR DIABETES MELLITUS: ICD-10-CM

## 2025-04-17 DIAGNOSIS — Z13.220 SCREENING FOR LIPID DISORDERS: ICD-10-CM

## 2025-04-17 DIAGNOSIS — Z11.4 SCREENING FOR HIV (HUMAN IMMUNODEFICIENCY VIRUS): ICD-10-CM

## 2025-04-17 DIAGNOSIS — Z86.2 HISTORY OF IRON DEFICIENCY ANEMIA: ICD-10-CM

## 2025-04-17 LAB
25(OH)D3 SERPL-MCNC: 49.9 NG/ML (ref 30–100)
ALBUMIN SERPL BCG-MCNC: 4.1 G/DL (ref 3.5–5)
ALP SERPL-CCNC: 49 U/L (ref 34–104)
ALT SERPL W P-5'-P-CCNC: 17 U/L (ref 7–52)
ANION GAP SERPL CALCULATED.3IONS-SCNC: 7 MMOL/L (ref 4–13)
AST SERPL W P-5'-P-CCNC: 17 U/L (ref 13–39)
BASOPHILS # BLD AUTO: 0.03 THOUSANDS/ÂΜL (ref 0–0.1)
BASOPHILS NFR BLD AUTO: 1 % (ref 0–1)
BILIRUB SERPL-MCNC: 0.85 MG/DL (ref 0.2–1)
BUN SERPL-MCNC: 11 MG/DL (ref 5–25)
CALCIUM SERPL-MCNC: 9.3 MG/DL (ref 8.4–10.2)
CHLORIDE SERPL-SCNC: 100 MMOL/L (ref 96–108)
CHOLEST SERPL-MCNC: 250 MG/DL (ref ?–200)
CO2 SERPL-SCNC: 29 MMOL/L (ref 21–32)
CREAT SERPL-MCNC: 0.75 MG/DL (ref 0.6–1.3)
EOSINOPHIL # BLD AUTO: 0.02 THOUSAND/ÂΜL (ref 0–0.61)
EOSINOPHIL NFR BLD AUTO: 0 % (ref 0–6)
ERYTHROCYTE [DISTWIDTH] IN BLOOD BY AUTOMATED COUNT: 14.6 % (ref 11.6–15.1)
FERRITIN SERPL-MCNC: 13 NG/ML (ref 30–307)
GFR SERPL CREATININE-BSD FRML MDRD: 95 ML/MIN/1.73SQ M
GLUCOSE P FAST SERPL-MCNC: 80 MG/DL (ref 65–99)
HCT VFR BLD AUTO: 38.5 % (ref 34.8–46.1)
HDLC SERPL-MCNC: 66 MG/DL
HGB BLD-MCNC: 13.7 G/DL (ref 11.5–15.4)
IMM GRANULOCYTES # BLD AUTO: 0.02 THOUSAND/UL (ref 0–0.2)
IMM GRANULOCYTES NFR BLD AUTO: 0 % (ref 0–2)
IRON SATN MFR SERPL: 24 % (ref 15–50)
IRON SERPL-MCNC: 92 UG/DL (ref 50–212)
LDLC SERPL CALC-MCNC: 167 MG/DL (ref 0–100)
LYMPHOCYTES # BLD AUTO: 1.99 THOUSANDS/ÂΜL (ref 0.6–4.47)
LYMPHOCYTES NFR BLD AUTO: 40 % (ref 14–44)
MCH RBC QN AUTO: 27.1 PG (ref 26.8–34.3)
MCHC RBC AUTO-ENTMCNC: 35.6 G/DL (ref 31.4–37.4)
MCV RBC AUTO: 76 FL (ref 82–98)
MONOCYTES # BLD AUTO: 0.4 THOUSAND/ÂΜL (ref 0.17–1.22)
MONOCYTES NFR BLD AUTO: 8 % (ref 4–12)
NEUTROPHILS # BLD AUTO: 2.49 THOUSANDS/ÂΜL (ref 1.85–7.62)
NEUTS SEG NFR BLD AUTO: 51 % (ref 43–75)
NRBC BLD AUTO-RTO: 0 /100 WBCS
PLATELET # BLD AUTO: 256 THOUSANDS/UL (ref 149–390)
PMV BLD AUTO: 10.3 FL (ref 8.9–12.7)
POTASSIUM SERPL-SCNC: 4.1 MMOL/L (ref 3.5–5.3)
PROT SERPL-MCNC: 7.1 G/DL (ref 6.4–8.4)
RBC # BLD AUTO: 5.06 MILLION/UL (ref 3.81–5.12)
SODIUM SERPL-SCNC: 136 MMOL/L (ref 135–147)
T4 FREE SERPL-MCNC: 0.76 NG/DL (ref 0.61–1.12)
TIBC SERPL-MCNC: 379.4 UG/DL (ref 250–450)
TRANSFERRIN SERPL-MCNC: 271 MG/DL (ref 203–362)
TRIGL SERPL-MCNC: 84 MG/DL (ref ?–150)
TSH SERPL DL<=0.05 MIU/L-ACNC: 2.27 UIU/ML (ref 0.45–4.5)
UIBC SERPL-MCNC: 287 UG/DL (ref 155–355)
WBC # BLD AUTO: 4.95 THOUSAND/UL (ref 4.31–10.16)

## 2025-04-17 PROCEDURE — 83550 IRON BINDING TEST: CPT

## 2025-04-17 PROCEDURE — 36415 COLL VENOUS BLD VENIPUNCTURE: CPT

## 2025-04-17 PROCEDURE — 82728 ASSAY OF FERRITIN: CPT

## 2025-04-17 PROCEDURE — 87389 HIV-1 AG W/HIV-1&-2 AB AG IA: CPT

## 2025-04-17 PROCEDURE — 85025 COMPLETE CBC W/AUTO DIFF WBC: CPT

## 2025-04-17 PROCEDURE — 80053 COMPREHEN METABOLIC PANEL: CPT

## 2025-04-17 PROCEDURE — 84443 ASSAY THYROID STIM HORMONE: CPT

## 2025-04-17 PROCEDURE — 84439 ASSAY OF FREE THYROXINE: CPT

## 2025-04-17 PROCEDURE — 83036 HEMOGLOBIN GLYCOSYLATED A1C: CPT

## 2025-04-17 PROCEDURE — 83540 ASSAY OF IRON: CPT

## 2025-04-17 PROCEDURE — 80061 LIPID PANEL: CPT

## 2025-04-17 PROCEDURE — 82306 VITAMIN D 25 HYDROXY: CPT

## 2025-04-18 ENCOUNTER — HOSPITAL ENCOUNTER (OUTPATIENT)
Dept: ULTRASOUND IMAGING | Facility: HOSPITAL | Age: 45
End: 2025-04-18
Attending: PHYSICIAN ASSISTANT
Payer: COMMERCIAL

## 2025-04-18 ENCOUNTER — TELEPHONE (OUTPATIENT)
Dept: ADMINISTRATIVE | Facility: OTHER | Age: 45
End: 2025-04-18

## 2025-04-18 DIAGNOSIS — R10.10 UPPER ABDOMINAL PAIN, UNSPECIFIED: ICD-10-CM

## 2025-04-18 DIAGNOSIS — R11.0 NAUSEA: ICD-10-CM

## 2025-04-18 DIAGNOSIS — Z90.710 S/P TOTAL ABDOMINAL HYSTERECTOMY: Primary | ICD-10-CM

## 2025-04-18 LAB
EST. AVERAGE GLUCOSE BLD GHB EST-MCNC: 123 MG/DL
HBA1C MFR BLD: 5.9 %
HIV 1+2 AB+HIV1 P24 AG SERPL QL IA: NORMAL

## 2025-04-18 PROCEDURE — 76700 US EXAM ABDOM COMPLETE: CPT

## 2025-04-18 NOTE — TELEPHONE ENCOUNTER
----- Message from Jacquelin JALLOH sent at 4/15/2025  4:30 PM EDT -----  Regarding: Care Gap Request  04/15/25 4:30 PM    Hello, our patient attached above has had total abdominal hysterectomy completed/performed. Please assist in obtaining the exclusion documentation by pulling a previous Electronic Medical Record (EMR) document. The previous EMR is under Media. The date of service is 8/14/2023.     Thank you,  Jacquelin Salazar MA  [facility]

## 2025-04-18 NOTE — TELEPHONE ENCOUNTER
Please review Exclusion document(s-) for hysterectomy , found in Media  DOS 8-14-23 Document type Consult notes Ext- description 8/14/23 NEVIN STATE HER .

## 2025-04-21 PROBLEM — Z90.710 S/P TOTAL ABDOMINAL HYSTERECTOMY: Status: ACTIVE | Noted: 2025-04-21

## 2025-04-21 NOTE — TELEPHONE ENCOUNTER
04/21/25 8:44 AM    After review of Exclusion document(s) for total abdominal hysterectomy, documentation qualifies as an exclusion and the problem list  within Epic has been updated.    Kera Murillo

## 2025-04-21 NOTE — TELEPHONE ENCOUNTER
Upon review of the In Basket request we were able to locate, review, and update the patient chart as requested for Total Abdominal Hysterectomy.    Any additional questions or concerns should be emailed to the Practice Liaisons via the appropriate education email address, please do not reply via In Basket.    Thank you  Dona Duong   [facility]

## 2025-04-24 ENCOUNTER — TELEPHONE (OUTPATIENT)
Dept: FAMILY MEDICINE CLINIC | Facility: CLINIC | Age: 45
End: 2025-04-24

## 2025-04-24 NOTE — TELEPHONE ENCOUNTER
[id number]:  Called patient to relay test results below. Patient is aware and no questions or concerns.     ----- Message from Jonn Medrano PA-C sent at 4/23/2025  4:53 PM EDT -----  Hi Ladies,    Please inform patient that your mammogram did not show any masses changes concerning for breast cancer at this time. You may continue routine screening in 1 year.    Also, labs overall looked good but her cholesterol and blood sugar are slightly elevated at this time. We will talk about lifestyle changes recommended at her follow up appointment.     Best,  Jonn Medrano PA-C

## 2025-05-07 ENCOUNTER — ANESTHESIA EVENT (OUTPATIENT)
Dept: PERIOP | Facility: HOSPITAL | Age: 45
End: 2025-05-07
Payer: COMMERCIAL

## 2025-05-07 ENCOUNTER — HOSPITAL ENCOUNTER (OUTPATIENT)
Dept: PERIOP | Facility: HOSPITAL | Age: 45
Setting detail: OUTPATIENT SURGERY
Discharge: HOME | End: 2025-05-07
Attending: STUDENT IN AN ORGANIZED HEALTH CARE EDUCATION/TRAINING PROGRAM
Payer: COMMERCIAL

## 2025-05-07 ENCOUNTER — ANESTHESIA (OUTPATIENT)
Dept: PERIOP | Facility: HOSPITAL | Age: 45
End: 2025-05-07
Payer: COMMERCIAL

## 2025-05-07 VITALS
SYSTOLIC BLOOD PRESSURE: 120 MMHG | HEART RATE: 63 BPM | RESPIRATION RATE: 20 BRPM | DIASTOLIC BLOOD PRESSURE: 65 MMHG | TEMPERATURE: 97.8 F | OXYGEN SATURATION: 100 %

## 2025-05-07 DIAGNOSIS — R79.0 LOW FERRITIN LEVEL: Primary | ICD-10-CM

## 2025-05-07 DIAGNOSIS — Z12.11 SCREENING FOR COLON CANCER: ICD-10-CM

## 2025-05-07 PROCEDURE — 45378 DIAGNOSTIC COLONOSCOPY: CPT | Performed by: INTERNAL MEDICINE

## 2025-05-07 RX ORDER — SODIUM CHLORIDE, SODIUM LACTATE, POTASSIUM CHLORIDE, CALCIUM CHLORIDE 600; 310; 30; 20 MG/100ML; MG/100ML; MG/100ML; MG/100ML
INJECTION, SOLUTION INTRAVENOUS CONTINUOUS PRN
Status: DISCONTINUED | OUTPATIENT
Start: 2025-05-07 | End: 2025-05-07

## 2025-05-07 RX ORDER — FERROUS SULFATE 324(65)MG
324 TABLET, DELAYED RELEASE (ENTERIC COATED) ORAL
Qty: 90 TABLET | Refills: 1 | Status: SHIPPED | OUTPATIENT
Start: 2025-05-07

## 2025-05-07 RX ORDER — SODIUM CHLORIDE, SODIUM LACTATE, POTASSIUM CHLORIDE, CALCIUM CHLORIDE 600; 310; 30; 20 MG/100ML; MG/100ML; MG/100ML; MG/100ML
100 INJECTION, SOLUTION INTRAVENOUS CONTINUOUS
Status: DISCONTINUED | OUTPATIENT
Start: 2025-05-07 | End: 2025-05-11 | Stop reason: HOSPADM

## 2025-05-07 RX ORDER — PROPOFOL 10 MG/ML
INJECTION, EMULSION INTRAVENOUS AS NEEDED
Status: DISCONTINUED | OUTPATIENT
Start: 2025-05-07 | End: 2025-05-07

## 2025-05-07 RX ORDER — LIDOCAINE HYDROCHLORIDE 10 MG/ML
INJECTION, SOLUTION EPIDURAL; INFILTRATION; INTRACAUDAL; PERINEURAL AS NEEDED
Status: DISCONTINUED | OUTPATIENT
Start: 2025-05-07 | End: 2025-05-07

## 2025-05-07 RX ADMIN — PROPOFOL 50 MG: 10 INJECTION, EMULSION INTRAVENOUS at 12:35

## 2025-05-07 RX ADMIN — LIDOCAINE HYDROCHLORIDE 50 MG: 10 INJECTION, SOLUTION EPIDURAL; INFILTRATION; INTRACAUDAL; PERINEURAL at 12:32

## 2025-05-07 RX ADMIN — SODIUM CHLORIDE, SODIUM LACTATE, POTASSIUM CHLORIDE, AND CALCIUM CHLORIDE: .6; .31; .03; .02 INJECTION, SOLUTION INTRAVENOUS at 12:26

## 2025-05-07 RX ADMIN — PROPOFOL 50 MG: 10 INJECTION, EMULSION INTRAVENOUS at 12:38

## 2025-05-07 RX ADMIN — PROPOFOL 150 MG: 10 INJECTION, EMULSION INTRAVENOUS at 12:32

## 2025-05-07 RX ADMIN — SODIUM CHLORIDE, SODIUM LACTATE, POTASSIUM CHLORIDE, AND CALCIUM CHLORIDE 100 ML/HR: .6; .31; .03; .02 INJECTION, SOLUTION INTRAVENOUS at 11:51

## 2025-05-07 NOTE — ANESTHESIA POSTPROCEDURE EVALUATION
Post-Op Assessment Note    CV Status:  Stable  Pain Score: 0    Pain management: satisfactory to patient       Mental Status:  Alert and awake   Hydration Status:  Euvolemic   PONV Controlled:  Controlled   Airway Patency:  Patent     Post Op Vitals Reviewed: Yes    No anethesia notable event occurred.    Staff: CRNA           Last Filed PACU Vitals:  Vitals Value Taken Time   Temp 97.8    Pulse 74 05/07/25 1245   /58    Resp 16    SpO2 97 % 05/07/25 1245   Vitals shown include unfiled device data.

## 2025-05-07 NOTE — ANESTHESIA PREPROCEDURE EVALUATION
Procedure:  COLONOSCOPY    Relevant Problems   No relevant active problems        Physical Exam    Airway    Mallampati score: II  TM Distance: >3 FB  Neck ROM: full     Dental       Cardiovascular  Cardiovascular exam normal    Pulmonary  Pulmonary exam normal     Other Findings  post-pubertal.      Anesthesia Plan  ASA Score- 2     Anesthesia Type- IV sedation with anesthesia with ASA Monitors.         Additional Monitors:     Airway Plan:            Plan Factors-Exercise tolerance (METS): >4 METS.    Chart reviewed. EKG reviewed. Imaging results reviewed. Existing labs reviewed. Patient summary reviewed.                  Induction- intravenous.    Postoperative Plan-         Informed Consent- Anesthetic plan and risks discussed with patient.  I personally reviewed this patient with the CRNA. Discussed and agreed on the Anesthesia Plan with the CRNA..      NPO Status:  No vitals data found for the desired time range.

## 2025-05-07 NOTE — H&P
History and Physical - [facility]  Yareli Yan 44 y.o. female MRN: [id number]                  HPI: Yareli Yan is a 44 y.o. year old female who presents for colon cancer screening.      REVIEW OF SYSTEMS: Per the HPI, and otherwise unremarkable.    Historical Information   Past Medical History:   Diagnosis Date    Anemia      Past Surgical History:   Procedure Laterality Date    HYSTERECTOMY       Social History   Social History     Substance and Sexual Activity   Alcohol Use Not Currently    Comment: social     Social History     Substance and Sexual Activity   Drug Use Not Currently     Social History     Tobacco Use   Smoking Status Never   Smokeless Tobacco Never     History reviewed. No pertinent family history.    Meds/Allergies       Current Outpatient Medications:     ferrous sulfate 324 (65 Fe) mg    omeprazole (PriLOSEC) 40 MG capsule    oxybutynin (DITROPAN-XL) 10 MG 24 hr tablet    sucralfate (CARAFATE) 1 g/10 mL suspension    Current Facility-Administered Medications:     lactated ringers infusion, 100 mL/hr, Intravenous, Continuous, 100 mL/hr at 05/07/25 1151    No Known Allergies    Objective     /74   Pulse 67   Temp (!) 97.1 °F (36.2 °C) (Tympanic)   Resp 18   SpO2 99%       PHYSICAL EXAM    Gen: NAD  Head: NCAT  CV: RRR  CHEST: Clear  ABD: soft, NT/ND  EXT: no edema      ASSESSMENT/PLAN:  This is a 44 y.o. year old female here for colonoscopy, and she is stable and optimized for her procedure.

## 2025-05-08 ENCOUNTER — RESULTS FOLLOW-UP (OUTPATIENT)
Dept: FAMILY MEDICINE CLINIC | Facility: CLINIC | Age: 45
End: 2025-05-08

## 2025-05-08 NOTE — TELEPHONE ENCOUNTER
[id number]:  Called patient to relay test results below. Patient is aware.     ----- Message from Jonn Medrano PA-C sent at 5/7/2025 11:06 AM EDT -----  Hey Ladies,     Please call to inform patient that ferritin was slightly decreased so we will begin daily iron supplementation. Advise that she takes this medication with breakfast.     Any other questions please lmK !   Jonn

## 2025-05-13 ENCOUNTER — RESULTS FOLLOW-UP (OUTPATIENT)
Dept: FAMILY MEDICINE CLINIC | Facility: CLINIC | Age: 45
End: 2025-05-13

## 2025-05-13 NOTE — TELEPHONE ENCOUNTER
[id number]:  Called patient to relay message below. Patient did not answer and unable to leave a voicemail.     ----- Message from Jonn Medrano PA-C sent at 5/12/2025  3:08 PM EDT -----  Hey Ladies,     Please call and inform patient that she will unfortunatley have to repeat colonoscopy in 1 year due to inadequate preparation.     Any questions, lmk !   Jonn

## 2025-05-15 NOTE — TELEPHONE ENCOUNTER
----- Message from Jonn Medrano PA-C sent at 5/12/2025  3:08 PM EDT -----  Hey Ladies,     Please call and inform patient that she will unfortunatley have to repeat colonoscopy in 1 year due to inadequate preparation.     Any questions, lmk !   Jonn

## 2025-05-15 NOTE — TELEPHONE ENCOUNTER
# [phone] assisted in the call .  I was unable to leave a message due to voicemail not being set up.      ----- Message from Jonn Medrano PA-C sent at 5/12/2025  3:08 PM EDT -----  Hey Ladies,     Please call and inform patient that she will unfortunatley have to repeat colonoscopy in 1 year due to inadequate preparation.     Any questions, lmk !   Jonn

## 2025-05-19 NOTE — TELEPHONE ENCOUNTER
3rd Attempt:   [id number]:  Called patient to inform her that she will have to repeat colonoscopy. Patient did not answer and could not leave a voicemail. Dialed both numbers with same response.  ----- Message from Jonn Medrano PA-C sent at 5/12/2025  3:08 PM EDT -----  Hey Ladies,     Please call and inform patient that she will unfortunatley have to repeat colonoscopy in 1 year due to inadequate preparation.     Any questions, lmk !   Jonn

## 2025-05-27 ENCOUNTER — OFFICE VISIT (OUTPATIENT)
Dept: FAMILY MEDICINE CLINIC | Facility: CLINIC | Age: 45
End: 2025-05-27
Payer: COMMERCIAL

## 2025-05-27 VITALS
SYSTOLIC BLOOD PRESSURE: 110 MMHG | TEMPERATURE: 97.7 F | HEART RATE: 90 BPM | WEIGHT: 158.2 LBS | DIASTOLIC BLOOD PRESSURE: 82 MMHG | OXYGEN SATURATION: 98 % | BODY MASS INDEX: 31.95 KG/M2

## 2025-05-27 DIAGNOSIS — R20.0 NUMBNESS AND TINGLING IN LEFT HAND: Primary | ICD-10-CM

## 2025-05-27 DIAGNOSIS — G44.209 ACUTE NON INTRACTABLE TENSION-TYPE HEADACHE: ICD-10-CM

## 2025-05-27 DIAGNOSIS — R20.2 NUMBNESS AND TINGLING IN LEFT HAND: Primary | ICD-10-CM

## 2025-05-27 PROCEDURE — 99214 OFFICE O/P EST MOD 30 MIN: CPT | Performed by: PHYSICIAN ASSISTANT

## 2025-05-27 RX ORDER — IBUPROFEN 800 MG/1
TABLET, FILM COATED ORAL
COMMUNITY
Start: 2025-04-17 | End: 2025-05-27 | Stop reason: ALTCHOICE

## 2025-05-27 NOTE — PROGRESS NOTES
"Name: Yareli Yan      : 1980      MRN: [id number]  Encounter Provider: Jonn Medrano PA-C  Encounter Date: 2025   [facility]  :  Assessment & Plan  Numbness and tingling in left hand     - Previously discussed at past visit, EMG was ordered but not yet completed    - Numbness and tingling persists but patient has not implemented nightly bracing as she was out of town and did not have opportunity to buy    - Thankfully EMG scheduled for Ivett 10    - will see patient back in 1 month to review emg results. In meantime nightly wrist bracing advised        Acute non intractable tension-type headache     - Pt reports new onset headache yesterday    - Describes pain that wraps around her head like a band   - Has hx of recurrent headaches but states that this is the first episode in \"quite some time\"    - Denies any NVD, vision change, hx of head trauma or LOC    - Typically responsive to ibuprofen but only mild relief noted today    - Neuro exam WNL, will trial excedrin for headache relief    - ED Parameters give, patient to return if symptoms worsen. Will recheck in 4 weeks as well  Orders:    aspirin-acetaminophen-caffeine (EXCEDRIN MIGRAINE) 250-250-65 MG per tablet; Take 1 tablet by mouth daily as needed for headaches           History of Present Illness   Yareli Yan is a 44 y.o. female presenting w/ 2 day hx of headache. Has hx of headaches but this is first episode in \" some time.\" Describes pain as band like sensation across her forehead, tension type pain rated 7/10. Denies NVD, head trauma, recent LOC, vision change. Has trialed ibuprofen but has only provided mild relief and pain is still there.       Review of Systems   Constitutional:  Negative for fatigue and fever.   Respiratory:  Negative for cough and shortness of breath.    Cardiovascular:  Negative for chest pain.   Gastrointestinal:  Negative for diarrhea, nausea and vomiting. "   Neurological:  Positive for numbness and headaches.       Objective   /82   Pulse 90   Temp 97.7 °F (36.5 °C)   Wt 71.8 kg (158 lb 3.2 oz)   SpO2 98%   BMI 31.95 kg/m²      Physical Exam  Constitutional:       Appearance: Normal appearance.   HENT:      Head: Normocephalic.      Right Ear: External ear normal.      Left Ear: External ear normal.      Nose: Nose normal.      Mouth/Throat:      Mouth: Mucous membranes are moist.      Pharynx: Oropharynx is clear.     Eyes:      Conjunctiva/sclera: Conjunctivae normal.       Cardiovascular:      Rate and Rhythm: Normal rate and regular rhythm.      Heart sounds: Normal heart sounds.   Pulmonary:      Effort: Pulmonary effort is normal.      Breath sounds: Normal breath sounds.   Abdominal:      General: Bowel sounds are normal.      Palpations: Abdomen is soft.     Neurological:      General: No focal deficit present.      Mental Status: She is alert and oriented to person, place, and time.      Cranial Nerves: Cranial nerves 2-12 are intact.      Sensory: Sensation is intact.      Motor: Motor function is intact.      Coordination: Coordination is intact.      Gait: Gait is intact.     Psychiatric:         Behavior: Behavior normal.

## 2025-06-24 ENCOUNTER — TELEPHONE (OUTPATIENT)
Age: 45
End: 2025-06-24

## 2025-06-24 NOTE — TELEPHONE ENCOUNTER
Patient rescheduled today's appointment at 4:40 pm with Jonn to tomorrow, Wednesday, 6/25/25 at 2:40 pm with Dr. Last.

## 2025-06-25 ENCOUNTER — OFFICE VISIT (OUTPATIENT)
Dept: FAMILY MEDICINE CLINIC | Facility: CLINIC | Age: 45
End: 2025-06-25
Payer: COMMERCIAL

## 2025-06-25 VITALS
BODY MASS INDEX: 31.1 KG/M2 | HEART RATE: 87 BPM | TEMPERATURE: 96.7 F | OXYGEN SATURATION: 97 % | WEIGHT: 154 LBS | SYSTOLIC BLOOD PRESSURE: 106 MMHG | DIASTOLIC BLOOD PRESSURE: 62 MMHG

## 2025-06-25 DIAGNOSIS — G56.02 LEFT CARPAL TUNNEL SYNDROME: Primary | ICD-10-CM

## 2025-06-25 PROCEDURE — 99213 OFFICE O/P EST LOW 20 MIN: CPT | Performed by: FAMILY MEDICINE

## 2025-06-25 RX ORDER — DICLOFENAC SODIUM 25 MG/1
25 TABLET, DELAYED RELEASE ORAL 2 TIMES DAILY
Qty: 28 TABLET | Refills: 0 | Status: SHIPPED | OUTPATIENT
Start: 2025-06-25 | End: 2025-07-09

## 2025-06-25 NOTE — PROGRESS NOTES
Name: Yareli Yan      : 1980      MRN: [id number]  Encounter Provider: Emery Healy MD  Encounter Date: 2025   Encounter department: [facility] PRIMARY CARE  :  Assessment & Plan  Left carpal tunnel syndrome  EMG showing left median neuropathy suggestive of carpal tunnel syndrome.  Patient was tender to palpation on left third digit MCP, with sensations, interosseous muscles and handgrip intact. Pt's work with lifting heavy objects likely has an underlying tendonitis occurring in addition to carpal tunnel  Due to longevity of 2 months and TTP of MCP, will order x-ray to rule out any fractures  Continue to advise wrist brace to be used as patient still has not used it and patient would benefit from hand PT and 2 weeks of Voltaren to help relieve underlying inflammation   If unrelieved, will consider corticosteroid injection and/or further imaging such as MRI and hand surgery referral.  Orders:    Ambulatory Referral to Physical Therapy; Future    XR hand 3+ vw left; Future    diclofenac sodium (VOLTAREN) 25 MG EC tablet; Take 1 tablet (25 mg total) by mouth 2 (two) times a day for 14 days           History of Present Illness   Patient presenting to the clinic for follow-up on left hand pain.  Patient had an EMG done on Ivett 10, 2025 and with findings as below:     This is an abnormal electrodiagnostic study. This study shows:  1. Left median neuropathy at the wrist (supports the diagnosis of carpal tunnel syndrome) which is mild in nature with demyelinating features   2. Left ulnar motor and sensory nerve conduction studies are normal.  3. There is no definite electrodiagnostic evidence for left cervical radiculopathy in tested muscles.     Pt continues to have left 3rd digit pain. This has been ongoing x 2 months. No accidents or injuries to the area. 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 Initially started in left shoulder, but noticed more in  left hand 3rd and 4th digit.     Pt has not used the night brace we recommended. Pt does not have diifficulty with handgrip and is not dropping objects.         Review of Systems   Respiratory:  Negative for shortness of breath.    Cardiovascular:  Negative for chest pain.   Musculoskeletal:         Left 3rd digit pain        Objective   /62   Pulse 87   Temp (!) 96.7 °F (35.9 °C)   Wt 69.9 kg (154 lb)   SpO2 97%   BMI 31.10 kg/m²      Physical Exam  Vitals reviewed.   Constitutional:       General: She is not in acute distress.     Appearance: She is well-developed.     Cardiovascular:      Rate and Rhythm: Normal rate and regular rhythm.      Heart sounds: No murmur heard.  Pulmonary:      Effort: Pulmonary effort is normal. No respiratory distress.      Breath sounds: Normal breath sounds.     Musculoskeletal:      Comments: Left hand: Sensations intact, interosseous muscles and handgrip strength intact.  Tender to palpation of the third digit MCP joint.      Skin:     General: Skin is warm and dry.      Capillary Refill: Capillary refill takes less than 2 seconds.     Neurological:      Mental Status: She is alert.     Psychiatric:         Mood and Affect: Mood normal.

## 2025-08-13 ENCOUNTER — OFFICE VISIT (OUTPATIENT)
Dept: FAMILY MEDICINE CLINIC | Facility: CLINIC | Age: 45
End: 2025-08-13
Payer: COMMERCIAL